# Patient Record
Sex: MALE | Race: WHITE | Employment: OTHER | URBAN - METROPOLITAN AREA
[De-identification: names, ages, dates, MRNs, and addresses within clinical notes are randomized per-mention and may not be internally consistent; named-entity substitution may affect disease eponyms.]

---

## 2017-02-13 ENCOUNTER — OFFICE VISIT (OUTPATIENT)
Dept: ORTHOPEDICS CLINIC | Facility: CLINIC | Age: 57
End: 2017-02-13

## 2017-02-13 ENCOUNTER — HOSPITAL ENCOUNTER (OUTPATIENT)
Dept: GENERAL RADIOLOGY | Facility: HOSPITAL | Age: 57
Discharge: HOME | End: 2017-02-13
Attending: ORTHOPAEDIC SURGERY
Payer: COMMERCIAL

## 2017-02-13 DIAGNOSIS — M25.562 LEFT KNEE PAIN, UNSPECIFIED CHRONICITY: ICD-10-CM

## 2017-02-13 DIAGNOSIS — M16.10 ARTHRITIS OF HIP: ICD-10-CM

## 2017-02-13 DIAGNOSIS — M25.552 LEFT HIP PAIN: ICD-10-CM

## 2017-02-13 DIAGNOSIS — M87.052 AVASCULAR NECROSIS OF HIP, LEFT (HCC): Primary | ICD-10-CM

## 2017-02-13 PROCEDURE — 73560 X-RAY EXAM OF KNEE 1 OR 2: CPT

## 2017-02-13 PROCEDURE — 99212 OFFICE O/P EST SF 10 MIN: CPT | Performed by: ORTHOPAEDIC SURGERY

## 2017-02-13 PROCEDURE — 73502 X-RAY EXAM HIP UNI 2-3 VIEWS: CPT

## 2017-02-13 PROCEDURE — 99203 OFFICE O/P NEW LOW 30 MIN: CPT | Performed by: ORTHOPAEDIC SURGERY

## 2017-02-13 PROCEDURE — 73565 X-RAY EXAM OF KNEES: CPT

## 2017-02-13 RX ORDER — VALACYCLOVIR HYDROCHLORIDE 1 G/1
TABLET, FILM COATED ORAL
Refills: 3 | COMMUNITY
Start: 2016-12-22 | End: 2017-09-28

## 2017-02-13 NOTE — PROGRESS NOTES
Patient is a 60-year-old male who presents with long-standing history of left groin pain it became more pronounced 6 months ago.   He states he fell ice skating 7 years ago and was told he had a small pelvic fracture but never required being on crutches for AVN    Plan I discussed with him surgical versus conservative care with us just meeting today he would like to see if this could settle down and if this femoral head heels and incorporates he will be left with arthritis which the only surgical solution is

## 2017-03-27 ENCOUNTER — OFFICE VISIT (OUTPATIENT)
Dept: ORTHOPEDICS CLINIC | Facility: CLINIC | Age: 57
End: 2017-03-27

## 2017-03-27 DIAGNOSIS — M87.052 AVASCULAR NECROSIS OF HIP, LEFT (HCC): Primary | ICD-10-CM

## 2017-03-27 DIAGNOSIS — M16.10 ARTHRITIS OF HIP: ICD-10-CM

## 2017-03-27 PROCEDURE — 99213 OFFICE O/P EST LOW 20 MIN: CPT | Performed by: ORTHOPAEDIC SURGERY

## 2017-03-27 PROCEDURE — 99212 OFFICE O/P EST SF 10 MIN: CPT | Performed by: ORTHOPAEDIC SURGERY

## 2017-03-27 NOTE — PROGRESS NOTES
James Hanks presents for follow-up stating that he is much improved since his last visit. He originally rated his pain a 9 or 10 he rates it now it is 3. He has use of crutches which is provided significant relief and he is only taken occasional Aleve now.   He

## 2017-05-08 ENCOUNTER — HOSPITAL ENCOUNTER (OUTPATIENT)
Dept: GENERAL RADIOLOGY | Facility: HOSPITAL | Age: 57
Discharge: HOME | End: 2017-05-08
Attending: ORTHOPAEDIC SURGERY
Payer: COMMERCIAL

## 2017-05-08 ENCOUNTER — OFFICE VISIT (OUTPATIENT)
Dept: ORTHOPEDICS CLINIC | Facility: CLINIC | Age: 57
End: 2017-05-08

## 2017-05-08 DIAGNOSIS — R52 PAIN: ICD-10-CM

## 2017-05-08 DIAGNOSIS — M87.052 AVASCULAR NECROSIS OF HIP, LEFT (HCC): Primary | ICD-10-CM

## 2017-05-08 PROCEDURE — 72170 X-RAY EXAM OF PELVIS: CPT | Performed by: ORTHOPAEDIC SURGERY

## 2017-05-08 PROCEDURE — 99212 OFFICE O/P EST SF 10 MIN: CPT | Performed by: ORTHOPAEDIC SURGERY

## 2017-05-08 PROCEDURE — 99213 OFFICE O/P EST LOW 20 MIN: CPT | Performed by: ORTHOPAEDIC SURGERY

## 2017-05-08 NOTE — PROGRESS NOTES
She presents today for follow-up stating the pain is much improved. He is walking without an antalgic gait and without crutches and states he takes an occasional Aleve which is surprising to me.   The most important thing that he tells me today as his pain

## 2017-07-24 ENCOUNTER — HOSPITAL ENCOUNTER (OUTPATIENT)
Dept: GENERAL RADIOLOGY | Facility: HOSPITAL | Age: 57
Discharge: HOME | End: 2017-07-24
Attending: ORTHOPAEDIC SURGERY
Payer: COMMERCIAL

## 2017-07-24 ENCOUNTER — OFFICE VISIT (OUTPATIENT)
Dept: ORTHOPEDICS CLINIC | Facility: CLINIC | Age: 57
End: 2017-07-24

## 2017-07-24 DIAGNOSIS — M25.552 LEFT HIP PAIN: ICD-10-CM

## 2017-07-24 DIAGNOSIS — M87.052 AVASCULAR NECROSIS OF HIP, LEFT (HCC): Primary | ICD-10-CM

## 2017-07-24 PROCEDURE — 99213 OFFICE O/P EST LOW 20 MIN: CPT | Performed by: ORTHOPAEDIC SURGERY

## 2017-07-24 PROCEDURE — 99212 OFFICE O/P EST SF 10 MIN: CPT | Performed by: ORTHOPAEDIC SURGERY

## 2017-07-24 PROCEDURE — 73502 X-RAY EXAM HIP UNI 2-3 VIEWS: CPT | Performed by: ORTHOPAEDIC SURGERY

## 2017-07-24 NOTE — PROGRESS NOTES
Patient presents for follow-up he has some left hip discomfort but with surprising as he can ambulate about the city with occasional ache. If he sits for a while after long walks she will get up and have an ache and he can walk it off.   He denies night pa

## 2017-08-01 ENCOUNTER — TELEPHONE (OUTPATIENT)
Dept: ORTHOPEDICS CLINIC | Facility: CLINIC | Age: 57
End: 2017-08-01

## 2017-08-01 NOTE — TELEPHONE ENCOUNTER
Called GEORGETTE and s/w Ramesh to initiate PA for MRI left Hip. Gave clinicals per JR OV notes. MRI pending for clinicals. Tracking #[id number]. Clinicals faxed.

## 2017-08-04 NOTE — TELEPHONE ENCOUNTER
Per [email or web address]-    The reference number for this request is [id number]. This request is only valid from 8/1/2017 to 8/31/2017 at [address] [facility] and informed him of MRI auth and exp date. Gave him phone # to  to make appt and advised him to f/u after.

## 2017-08-23 ENCOUNTER — HOSPITAL ENCOUNTER (OUTPATIENT)
Dept: MRI IMAGING | Age: 57
Discharge: HOME | End: 2017-08-23
Attending: ORTHOPAEDIC SURGERY
Payer: COMMERCIAL

## 2017-08-23 DIAGNOSIS — M87.052 AVASCULAR NECROSIS OF HIP, LEFT (HCC): ICD-10-CM

## 2017-08-23 PROCEDURE — 73721 MRI JNT OF LWR EXTRE W/O DYE: CPT | Performed by: ORTHOPAEDIC SURGERY

## 2017-08-24 ENCOUNTER — TELEPHONE (OUTPATIENT)
Dept: ORTHOPEDICS CLINIC | Facility: CLINIC | Age: 57
End: 2017-08-24

## 2017-08-24 NOTE — TELEPHONE ENCOUNTER
Pt had MRI yesterday. Can not make it in August 28th - pt in [place]  Sept 4th Labor day   Sept 11th Dr. Mirella Ochoa off. Appointment has been made for Sept 18th. Please review MRI and call pt with results if able.

## 2017-08-24 NOTE — TELEPHONE ENCOUNTER
Christie Groves will definetly make himself available for Dr. Burgess Joseph phone call.    Please try again

## 2017-09-18 ENCOUNTER — OFFICE VISIT (OUTPATIENT)
Dept: ORTHOPEDICS CLINIC | Facility: CLINIC | Age: 57
End: 2017-09-18

## 2017-09-18 DIAGNOSIS — M16.10 ARTHRITIS OF HIP: ICD-10-CM

## 2017-09-18 DIAGNOSIS — M87.052 AVASCULAR NECROSIS OF HIP, LEFT (HCC): Primary | ICD-10-CM

## 2017-09-18 PROCEDURE — 99212 OFFICE O/P EST SF 10 MIN: CPT | Performed by: ORTHOPAEDIC SURGERY

## 2017-09-18 PROCEDURE — 99213 OFFICE O/P EST LOW 20 MIN: CPT | Performed by: ORTHOPAEDIC SURGERY

## 2017-09-18 NOTE — PROGRESS NOTES
Patient presents for follow-up his MRI was discussed with him and reviewed in detail today.   He is avascular necrosis which is confirmed there is still a spherical head joint but there is not a flat collapse or architectural alteration prior x-rays were co

## 2017-09-28 ENCOUNTER — OFFICE VISIT (OUTPATIENT)
Dept: INTERNAL MEDICINE CLINIC | Facility: CLINIC | Age: 57
End: 2017-09-28

## 2017-09-28 VITALS
RESPIRATION RATE: 18 BRPM | WEIGHT: 188.38 LBS | DIASTOLIC BLOOD PRESSURE: 114 MMHG | BODY MASS INDEX: 26.67 KG/M2 | HEIGHT: 70.5 IN | HEART RATE: 110 BPM | TEMPERATURE: 99 F | SYSTOLIC BLOOD PRESSURE: 174 MMHG

## 2017-09-28 DIAGNOSIS — M87.9 OSTEONECROSIS OF LEFT HIP (HCC): ICD-10-CM

## 2017-09-28 DIAGNOSIS — Z12.11 COLON CANCER SCREENING: ICD-10-CM

## 2017-09-28 DIAGNOSIS — Z72.89 ALCOHOL USE: ICD-10-CM

## 2017-09-28 DIAGNOSIS — R03.0 ELEVATED BLOOD PRESSURE READING: Primary | ICD-10-CM

## 2017-09-28 PROCEDURE — 99203 OFFICE O/P NEW LOW 30 MIN: CPT | Performed by: INTERNAL MEDICINE

## 2017-09-28 PROCEDURE — 99212 OFFICE O/P EST SF 10 MIN: CPT | Performed by: INTERNAL MEDICINE

## 2017-09-28 RX ORDER — VALACYCLOVIR HYDROCHLORIDE 1 G/1
TABLET, FILM COATED ORAL
Qty: 21 TABLET | Refills: 3 | Status: SHIPPED | OUTPATIENT
Start: 2017-09-28 | End: 2018-06-15

## 2017-09-28 NOTE — PROGRESS NOTES
HPI:    Patient ID: Mark Anthony Brumfield is a 65year old male. HPI  Patient is here for initial visit to meet and greet as well as requesting referral to GI doctor for colon cancer screening.   He has been seen here by the orthopedic doctor for recent dena Musculoskeletal: Positive for joint pain. Skin: Negative for rash. Neurological: Negative for weakness, numbness and headaches. Hematological: Does not bruise/bleed easily. Psychiatric/Behavioral: Negative for depressed mood.  The patient is not n GI doctor.    (M87.9) Osteonecrosis of left hip ([address]  Plan: Continue follow-up with orthopedic.    (Z78.9) Alcohol use  Plan: Patient was a little bit unclear on exactly how much she was using.   I did voice concern for possible alcohol withdrawal based on

## 2017-09-29 ENCOUNTER — TELEPHONE (OUTPATIENT)
Dept: INTERNAL MEDICINE CLINIC | Facility: CLINIC | Age: 57
End: 2017-09-29

## 2017-09-29 ENCOUNTER — NURSE ONLY (OUTPATIENT)
Dept: INTERNAL MEDICINE CLINIC | Facility: CLINIC | Age: 57
End: 2017-09-29

## 2017-09-29 VITALS — HEART RATE: 92 BPM | SYSTOLIC BLOOD PRESSURE: 162 MMHG | DIASTOLIC BLOOD PRESSURE: 106 MMHG

## 2017-09-29 DIAGNOSIS — R03.0 ELEVATED BLOOD PRESSURE READING: Primary | ICD-10-CM

## 2017-09-29 RX ORDER — METOPROLOL SUCCINATE 50 MG/1
50 TABLET, EXTENDED RELEASE ORAL DAILY
Qty: 30 TABLET | Refills: 5 | Status: SHIPPED | OUTPATIENT
Start: 2017-09-29 | End: 2018-04-29

## 2017-09-29 NOTE — PROGRESS NOTES
Patient here for blood pressure reading name and birth date confirmed. Patient confirms he is on no b/p medication at this time. Patient states \" I am very nervous\". Advised patient to take a few deep breaths and I will return to obtain his b/p.   Blood p

## 2017-09-29 NOTE — TELEPHONE ENCOUNTER
B/P check readings   1.160/102 pulse 104, 2. 142/100 pulse 94 3. 162/106 pulse 92. Patient advised to await call for recommendations.

## 2017-09-29 NOTE — TELEPHONE ENCOUNTER
Pt contacted, discussed hypertension, risk factors, ways to improve it, changing lifestyle habits, decreasing use of sodium, alcohol, increasing exercise, reducing stress. Pt verbalized understanding. Medication sent to pharmacy on file.

## 2017-09-29 NOTE — TELEPHONE ENCOUNTER
Please call the patient and discussed with him. Blood pressure is still elevated. We should start low-dose Toprol-XL 50 mg, dispense #30, 1 p.o. daily, 5 refills. Patient should start immediately. Contact the office with side effects.   Otherwise make a

## 2017-10-24 NOTE — PROGRESS NOTES
[address] [facility] [address] Gastroenterology                                                                                                  Clinic History and Physical     Pa Packs/day: 0.00      Years: 0.00         Types: Cigars  Smokeless tobacco: Never Used                      Alcohol use:  Yes              Comment: occ       Medications (Active prior to today's visit):    Current Outpatient P and oriented x4, and patient is having movements of all 4 extremities   Psych: Pt has a normal mood and affect, behavior is normal    Nursing note and vitals reviewed      Labs/Imaging:     Patient's labs and imaging were reviewed and discussed with patirosalie the miss rate of colonoscopy of 5-10% in the best of all circumstances. All questions were answered to the patient’s satisfaction.  The patient signed informed consent and elected to proceed with colonoscopy with intervention [i.e. polypectomy, stent placem

## 2017-10-25 ENCOUNTER — OFFICE VISIT (OUTPATIENT)
Dept: GASTROENTEROLOGY | Facility: CLINIC | Age: 57
End: 2017-10-25

## 2017-10-25 ENCOUNTER — TELEPHONE (OUTPATIENT)
Dept: GASTROENTEROLOGY | Facility: CLINIC | Age: 57
End: 2017-10-25

## 2017-10-25 VITALS
HEIGHT: 71.5 IN | HEART RATE: 72 BPM | BODY MASS INDEX: 26.02 KG/M2 | DIASTOLIC BLOOD PRESSURE: 98 MMHG | SYSTOLIC BLOOD PRESSURE: 144 MMHG | WEIGHT: 190 LBS

## 2017-10-25 DIAGNOSIS — Z12.11 SCREENING FOR COLON CANCER: Primary | ICD-10-CM

## 2017-10-25 PROCEDURE — 99212 OFFICE O/P EST SF 10 MIN: CPT | Performed by: NURSE PRACTITIONER

## 2017-10-25 PROCEDURE — 99243 OFF/OP CNSLTJ NEW/EST LOW 30: CPT | Performed by: NURSE PRACTITIONER

## 2017-10-25 NOTE — PATIENT INSTRUCTIONS
1. Schedule colonoscopy with Dr. Mathew Moore w/ IV Twilight    2.  bowel prep from pharmacy - split dose Suprep     3. Continue all medications for procedure     4. Read all bowel prep instructions carefully    5.  AVOID seeds, nuts, popcorn, raw fruit

## 2017-10-25 NOTE — H&P
[address] [facility] [address] Gastroenterology                                                                                                  Clinic History and Physical     Pa Packs/day: 0.00      Years: 0.00         Types: Cigars  Smokeless tobacco: Never Used                      Alcohol use:  Yes              Comment: occ       Medications (Active prior to today's visit):    Current Outpatient P and oriented x4, and patient is having movements of all 4 extremities   Psych: Pt has a normal mood and affect, behavior is normal    Nursing note and vitals reviewed      Labs/Imaging:     Patient's labs and imaging were reviewed and discussed with patirosalie colonoscopy of 5-10% in the best of all circumstances. All questions were answered to the patient’s satisfaction.  The patient signed informed consent and elected to proceed with colonoscopy with intervention [i.e. polypectomy, stent placement, etc.] as ind

## 2017-10-25 NOTE — TELEPHONE ENCOUNTER
Scheduled for:  Colonoscopy 94911  Provider Name: Dr. Bernadette Rodgers  Date:  Monday, 1/8/2018  Location:  [facility]  Sedation:  IV sedation  Time:  9:00 am, arrival 8:00 am  Prep: Suprep  Meds/Allergies Reconciled?:  EM Allen reviewed   Diagnosis with codes:  Scr

## 2017-11-28 ENCOUNTER — OFFICE VISIT (OUTPATIENT)
Dept: INTERNAL MEDICINE CLINIC | Facility: CLINIC | Age: 57
End: 2017-11-28

## 2017-11-28 VITALS
TEMPERATURE: 99 F | SYSTOLIC BLOOD PRESSURE: 160 MMHG | HEIGHT: 71.5 IN | BODY MASS INDEX: 25.75 KG/M2 | WEIGHT: 188 LBS | HEART RATE: 111 BPM | DIASTOLIC BLOOD PRESSURE: 104 MMHG

## 2017-11-28 DIAGNOSIS — I10 ESSENTIAL HYPERTENSION: ICD-10-CM

## 2017-11-28 DIAGNOSIS — K13.70 ORAL LESION: Primary | ICD-10-CM

## 2017-11-28 PROCEDURE — 99213 OFFICE O/P EST LOW 20 MIN: CPT | Performed by: INTERNAL MEDICINE

## 2017-11-28 PROCEDURE — 99212 OFFICE O/P EST SF 10 MIN: CPT | Performed by: INTERNAL MEDICINE

## 2017-11-28 NOTE — PROGRESS NOTES
Rosario Gonzalez is a 65year old male.  Patient presents with:  Throat Problem: Pt c/o a lump on the right side of the throat that was noticed this past weekend    HPI:   On Sunday, 2 days ago, he developed low-grade fever of 100.9°, sweats and chills, a maxillary or frontal sinus tenderness, oropharynx with a roundish area of soft tissue prominence anterior to the right tonsil with slight overlying erythema without ulceration or exudate  NECK: Supple without mass or lymphadenopathy  LUNGS: Resonant to per

## 2017-11-28 NOTE — PATIENT INSTRUCTIONS
Please schedule an appointment with ENT. Please resume taking metoprolol daily. Follow-up soon with Dr. Arabella Gabriel.

## 2017-11-29 ENCOUNTER — MED REC SCAN ONLY (OUTPATIENT)
Dept: INTERNAL MEDICINE CLINIC | Facility: CLINIC | Age: 57
End: 2017-11-29

## 2017-11-29 ENCOUNTER — OFFICE VISIT (OUTPATIENT)
Dept: OTOLARYNGOLOGY | Facility: CLINIC | Age: 57
End: 2017-11-29

## 2017-11-29 ENCOUNTER — TELEPHONE (OUTPATIENT)
Dept: OTOLARYNGOLOGY | Facility: CLINIC | Age: 57
End: 2017-11-29

## 2017-11-29 ENCOUNTER — IMMUNIZATION (OUTPATIENT)
Dept: INTERNAL MEDICINE CLINIC | Facility: CLINIC | Age: 57
End: 2017-11-29

## 2017-11-29 VITALS
DIASTOLIC BLOOD PRESSURE: 98 MMHG | WEIGHT: 188 LBS | TEMPERATURE: 98 F | BODY MASS INDEX: 25.75 KG/M2 | HEIGHT: 71.5 IN | SYSTOLIC BLOOD PRESSURE: 170 MMHG

## 2017-11-29 DIAGNOSIS — J35.8 TONSILLAR MASS: Primary | ICD-10-CM

## 2017-11-29 DIAGNOSIS — Z23 NEED FOR VACCINATION: ICD-10-CM

## 2017-11-29 PROCEDURE — 99212 OFFICE O/P EST SF 10 MIN: CPT | Performed by: OTOLARYNGOLOGY

## 2017-11-29 PROCEDURE — 99244 OFF/OP CNSLTJ NEW/EST MOD 40: CPT | Performed by: OTOLARYNGOLOGY

## 2017-11-29 PROCEDURE — 90471 IMMUNIZATION ADMIN: CPT | Performed by: INTERNAL MEDICINE

## 2017-11-29 PROCEDURE — 90686 IIV4 VACC NO PRSV 0.5 ML IM: CPT | Performed by: INTERNAL MEDICINE

## 2017-11-29 RX ORDER — METHYLPREDNISOLONE 4 MG/1
TABLET ORAL
Qty: 1 PACKAGE | Refills: 0 | Status: SHIPPED | OUTPATIENT
Start: 2017-11-29 | End: 2017-12-27 | Stop reason: ALTCHOICE

## 2017-11-29 RX ORDER — AMOXICILLIN AND CLAVULANATE POTASSIUM 875; 125 MG/1; MG/1
1 TABLET, FILM COATED ORAL 2 TIMES DAILY
Qty: 28 TABLET | Refills: 0 | Status: SHIPPED | OUTPATIENT
Start: 2017-11-29 | End: 2017-12-13

## 2017-11-29 NOTE — TELEPHONE ENCOUNTER
Called pt's insurance [phone], spoke with Yanira Vergara, case for CT soft tissue of neck is currently pending clinical review, progress notes faxed to [phone], confirmation received, tracking number [id number].

## 2017-11-29 NOTE — PROGRESS NOTES
Davey Lozano is a 65year old male. Patient presents with:  Lesion: lower right side of mouth near right tonsil       HISTORY OF PRESENT ILLNESS  11/29/2017  Patient prevents for evaluation of a suspected right throat mass.   After his girlfriends da Abdominal pain and diarrhea. Endocrine Negative Cold intolerance and heat intolerance. Neuro Negative Tremors. Psych Negative Anxiety and depression. Integumentary Negative Frequent skin infections, pigment change and rash.    Hema/Lymph Negative Ea Outpatient Prescriptions:   •  methylPREDNISolone 4 MG Oral Tablet Therapy Pack, Take by oral route., Disp: 1 Package, Rfl: 0  •  Amoxicillin-Pot Clavulanate (AUGMENTIN) 875-125 MG Oral Tab, Take 1 tablet by mouth 2 (two) times daily. , Disp: 28 tablet, Rfl

## 2017-12-05 ENCOUNTER — HOSPITAL ENCOUNTER (OUTPATIENT)
Dept: CT IMAGING | Facility: HOSPITAL | Age: 57
Discharge: HOME | End: 2017-12-05
Attending: OTOLARYNGOLOGY
Payer: COMMERCIAL

## 2017-12-05 ENCOUNTER — TELEPHONE (OUTPATIENT)
Dept: GASTROENTEROLOGY | Facility: CLINIC | Age: 57
End: 2017-12-05

## 2017-12-05 DIAGNOSIS — J35.8 TONSILLAR MASS: ICD-10-CM

## 2017-12-05 PROCEDURE — 70491 CT SOFT TISSUE NECK W/DYE: CPT | Performed by: OTOLARYNGOLOGY

## 2017-12-05 PROCEDURE — 82565 ASSAY OF CREATININE: CPT

## 2017-12-06 NOTE — TELEPHONE ENCOUNTER
I spoke with this patient today to reschedule his procedure to an earlier date (before the end of the year) which I check and there was nothing available.  He stated that he wanted to reschedule earlier since [facility] will cover the procedure at 100% but ne

## 2017-12-27 ENCOUNTER — OFFICE VISIT (OUTPATIENT)
Dept: OTOLARYNGOLOGY | Facility: CLINIC | Age: 57
End: 2017-12-27

## 2017-12-27 VITALS
TEMPERATURE: 98 F | SYSTOLIC BLOOD PRESSURE: 139 MMHG | HEIGHT: 71 IN | DIASTOLIC BLOOD PRESSURE: 92 MMHG | BODY MASS INDEX: 26.6 KG/M2 | WEIGHT: 190 LBS

## 2017-12-27 DIAGNOSIS — J35.8 TONSILLAR MASS: Primary | ICD-10-CM

## 2017-12-27 PROCEDURE — 42100 BIOPSY ROOF OF MOUTH: CPT | Performed by: OTOLARYNGOLOGY

## 2017-12-27 PROCEDURE — 99212 OFFICE O/P EST SF 10 MIN: CPT | Performed by: OTOLARYNGOLOGY

## 2017-12-27 PROCEDURE — 99213 OFFICE O/P EST LOW 20 MIN: CPT | Performed by: OTOLARYNGOLOGY

## 2017-12-27 NOTE — PROGRESS NOTES
Arabella Reyes is a 65year old male. Patient presents with:  Results: CT soft tissue of neck done on 12-5      HISTORY OF PRESENT ILLNESS  11/29/2017  Patient prevents for evaluation of a suspected right throat mass.   After his girlfriends daughter w Past Medical History:   Diagnosis Date   • Decorative tattoo    • Essential hypertension        History reviewed. No pertinent surgical history.       REVIEW OF SYSTEMS    System Neg/Pos Details   Constitutional Negative Fatigue, fever and weight loss appears to be posterior to the anterior tonsillar pillar with increased vascularity. I wonder if this is a submucosal mass ie minor salivary gland tumor or perhaps a tonsillar mass extending through the mucosa?   On palpation it is firm and it is nontender

## 2017-12-28 ENCOUNTER — TELEPHONE (OUTPATIENT)
Dept: GASTROENTEROLOGY | Facility: CLINIC | Age: 57
End: 2017-12-28

## 2017-12-28 DIAGNOSIS — Z12.11 COLON CANCER SCREENING: Primary | ICD-10-CM

## 2017-12-28 NOTE — TELEPHONE ENCOUNTER
Rescheduled for:  Colonoscopy - [address] [facility]    Provider Name:  Dr. Juliet Glover  Date:   FROM - 1/8/18                        TO - 2/7/18  Location:   FROM - [facility]                      TO [facility]  Sedation:  IV  Time:   FROM - 9:00 am                      TO - (pt is a

## 2017-12-29 ENCOUNTER — TELEPHONE (OUTPATIENT)
Dept: OTOLARYNGOLOGY | Facility: CLINIC | Age: 57
End: 2017-12-29

## 2017-12-29 DIAGNOSIS — C09.9 RIGHT TONSILLAR SQUAMOUS CELL CARCINOMA (HCC): Primary | ICD-10-CM

## 2018-01-01 ENCOUNTER — APPOINTMENT (OUTPATIENT)
Dept: RADIATION ONCOLOGY | Facility: HOSPITAL | Age: 58
End: 2018-01-01
Attending: OTOLARYNGOLOGY
Payer: COMMERCIAL

## 2018-01-02 ENCOUNTER — TELEPHONE (OUTPATIENT)
Dept: OTOLARYNGOLOGY | Facility: CLINIC | Age: 58
End: 2018-01-02

## 2018-01-02 DIAGNOSIS — Z01.818 PRE-OP TESTING: Primary | ICD-10-CM

## 2018-01-02 NOTE — TELEPHONE ENCOUNTER
Called pt's insurance [phone], spoke with Lulu Landaverde, a prior authorization is not needed for PET scan, reference number [id number]. Left message for pt to call back to inform of above authorization information.

## 2018-01-02 NOTE — TELEPHONE ENCOUNTER
Left message for pt to call back with phone number for [facility] in order to obtain a prior authorization for the PET scan.

## 2018-01-02 NOTE — TELEPHONE ENCOUNTER
Pt called stating pt is returning a call. Information regarding prior auth not needed for pet scan was given to the pt. Pt requesting a call back from the nurse.   Call

## 2018-01-02 NOTE — TELEPHONE ENCOUNTER
pt called. LOV 12/27/17 with Madhuri Box. Spoke with Madhuri Box on 12/28/17 with path results. He is waiting for a call to schedule a procedure. Please call.

## 2018-01-04 ENCOUNTER — APPOINTMENT (OUTPATIENT)
Dept: LAB | Facility: HOSPITAL | Age: 58
End: 2018-01-04
Attending: OTOLARYNGOLOGY
Payer: COMMERCIAL

## 2018-01-04 ENCOUNTER — HOSPITAL ENCOUNTER (OUTPATIENT)
Dept: NUCLEAR MEDICINE | Facility: HOSPITAL | Age: 58
Discharge: HOME | End: 2018-01-04
Attending: OTOLARYNGOLOGY
Payer: COMMERCIAL

## 2018-01-04 ENCOUNTER — HOSPITAL ENCOUNTER (OUTPATIENT)
Dept: GENERAL RADIOLOGY | Facility: HOSPITAL | Age: 58
Discharge: HOME | End: 2018-01-04
Attending: OTOLARYNGOLOGY
Payer: COMMERCIAL

## 2018-01-04 DIAGNOSIS — Z01.818 PRE-OP TESTING: ICD-10-CM

## 2018-01-04 DIAGNOSIS — C09.9 RIGHT TONSILLAR SQUAMOUS CELL CARCINOMA (HCC): ICD-10-CM

## 2018-01-04 LAB
ANION GAP SERPL CALC-SCNC: 12 MMOL/L (ref 0–18)
APTT PPP: 27.7 SECONDS (ref 23.2–35.3)
BUN SERPL-MCNC: 13 MG/DL (ref 8–20)
BUN/CREAT SERPL: 16.9 (ref 10–20)
CALCIUM SERPL-MCNC: 9 MG/DL (ref 8.5–10.5)
CHLORIDE SERPL-SCNC: 100 MMOL/L (ref 95–110)
CO2 SERPL-SCNC: 26 MMOL/L (ref 22–32)
CREAT SERPL-MCNC: 0.77 MG/DL (ref 0.5–1.5)
ERYTHROCYTE [DISTWIDTH] IN BLOOD BY AUTOMATED COUNT: 12.7 % (ref 11–15)
GLUCOSE BLDC GLUCOMTR-MCNC: 127 MG/DL (ref 70–99)
GLUCOSE SERPL-MCNC: 120 MG/DL (ref 70–99)
HCT VFR BLD AUTO: 46.9 % (ref 41–52)
HGB BLD-MCNC: 16 G/DL (ref 13.5–17.5)
INR BLD: 1.1 (ref 0.9–1.2)
MCH RBC QN AUTO: 33.8 PG (ref 27–32)
MCHC RBC AUTO-ENTMCNC: 34.1 G/DL (ref 32–37)
MCV RBC AUTO: 99.2 FL (ref 80–100)
OSMOLALITY UR CALC.SUM OF ELEC: 287 MOSM/KG (ref 275–295)
PLATELET # BLD AUTO: 190 K/UL (ref 140–400)
PMV BLD AUTO: 7.8 FL (ref 7.4–10.3)
POTASSIUM SERPL-SCNC: 4.4 MMOL/L (ref 3.3–5.1)
PROTHROMBIN TIME: 13.8 SECONDS (ref 11.8–14.5)
RBC # BLD AUTO: 4.72 M/UL (ref 4.5–5.9)
SODIUM SERPL-SCNC: 138 MMOL/L (ref 136–144)
WBC # BLD AUTO: 7.2 K/UL (ref 4–11)

## 2018-01-04 PROCEDURE — 82962 GLUCOSE BLOOD TEST: CPT

## 2018-01-04 PROCEDURE — 36415 COLL VENOUS BLD VENIPUNCTURE: CPT

## 2018-01-04 PROCEDURE — 85730 THROMBOPLASTIN TIME PARTIAL: CPT

## 2018-01-04 PROCEDURE — 71046 X-RAY EXAM CHEST 2 VIEWS: CPT | Performed by: OTOLARYNGOLOGY

## 2018-01-04 PROCEDURE — 80048 BASIC METABOLIC PNL TOTAL CA: CPT

## 2018-01-04 PROCEDURE — 93005 ELECTROCARDIOGRAM TRACING: CPT

## 2018-01-04 PROCEDURE — 85027 COMPLETE CBC AUTOMATED: CPT

## 2018-01-04 PROCEDURE — 85610 PROTHROMBIN TIME: CPT

## 2018-01-04 PROCEDURE — 78815 PET IMAGE W/CT SKULL-THIGH: CPT | Performed by: OTOLARYNGOLOGY

## 2018-01-04 PROCEDURE — 93010 ELECTROCARDIOGRAM REPORT: CPT | Performed by: OTOLARYNGOLOGY

## 2018-01-05 ENCOUNTER — ANESTHESIA EVENT (OUTPATIENT)
Dept: SURGERY | Facility: HOSPITAL | Age: 58
End: 2018-01-05
Payer: COMMERCIAL

## 2018-01-05 ENCOUNTER — HOSPITAL ENCOUNTER (OUTPATIENT)
Facility: HOSPITAL | Age: 58
Setting detail: HOSPITAL OUTPATIENT SURGERY
Discharge: HOME | End: 2018-01-05
Attending: OTOLARYNGOLOGY | Admitting: OTOLARYNGOLOGY
Payer: COMMERCIAL

## 2018-01-05 ENCOUNTER — ANESTHESIA (OUTPATIENT)
Dept: SURGERY | Facility: HOSPITAL | Age: 58
End: 2018-01-05
Payer: COMMERCIAL

## 2018-01-05 ENCOUNTER — SURGERY (OUTPATIENT)
Age: 58
End: 2018-01-05

## 2018-01-05 VITALS
WEIGHT: 190 LBS | BODY MASS INDEX: 26.02 KG/M2 | RESPIRATION RATE: 16 BRPM | OXYGEN SATURATION: 96 % | SYSTOLIC BLOOD PRESSURE: 160 MMHG | HEART RATE: 73 BPM | HEIGHT: 71.5 IN | DIASTOLIC BLOOD PRESSURE: 99 MMHG | TEMPERATURE: 98 F

## 2018-01-05 DIAGNOSIS — C09.9 CANCER OF TONSIL (HCC): ICD-10-CM

## 2018-01-05 PROCEDURE — 0CBPXZZ EXCISION OF TONSILS, EXTERNAL APPROACH: ICD-10-PCS | Performed by: OTOLARYNGOLOGY

## 2018-01-05 PROCEDURE — 42842 EXTENSIVE SURGERY OF THROAT: CPT | Performed by: OTOLARYNGOLOGY

## 2018-01-05 RX ORDER — ONDANSETRON 2 MG/ML
INJECTION INTRAMUSCULAR; INTRAVENOUS AS NEEDED
Status: DISCONTINUED | OUTPATIENT
Start: 2018-01-05 | End: 2018-01-05 | Stop reason: SURG

## 2018-01-05 RX ORDER — MORPHINE SULFATE 2 MG/ML
2 INJECTION, SOLUTION INTRAMUSCULAR; INTRAVENOUS EVERY 10 MIN PRN
Status: DISCONTINUED | OUTPATIENT
Start: 2018-01-05 | End: 2018-01-05

## 2018-01-05 RX ORDER — CELECOXIB 200 MG/1
CAPSULE ORAL
Qty: 14 CAPSULE | Refills: 0 | Status: SHIPPED | OUTPATIENT
Start: 2018-01-05 | End: 2018-01-18

## 2018-01-05 RX ORDER — GLYCOPYRROLATE 0.2 MG/ML
INJECTION INTRAMUSCULAR; INTRAVENOUS AS NEEDED
Status: DISCONTINUED | OUTPATIENT
Start: 2018-01-05 | End: 2018-01-05 | Stop reason: SURG

## 2018-01-05 RX ORDER — SODIUM CHLORIDE, SODIUM LACTATE, POTASSIUM CHLORIDE, CALCIUM CHLORIDE 600; 310; 30; 20 MG/100ML; MG/100ML; MG/100ML; MG/100ML
INJECTION, SOLUTION INTRAVENOUS CONTINUOUS
Status: DISCONTINUED | OUTPATIENT
Start: 2018-01-05 | End: 2018-01-05

## 2018-01-05 RX ORDER — NALOXONE HYDROCHLORIDE 0.4 MG/ML
80 INJECTION, SOLUTION INTRAMUSCULAR; INTRAVENOUS; SUBCUTANEOUS AS NEEDED
Status: DISCONTINUED | OUTPATIENT
Start: 2018-01-05 | End: 2018-01-05

## 2018-01-05 RX ORDER — ONDANSETRON 2 MG/ML
4 INJECTION INTRAMUSCULAR; INTRAVENOUS ONCE AS NEEDED
Status: DISCONTINUED | OUTPATIENT
Start: 2018-01-05 | End: 2018-01-05

## 2018-01-05 RX ORDER — ACETAMINOPHEN 500 MG
1000 TABLET ORAL ONCE
Status: COMPLETED | OUTPATIENT
Start: 2018-01-05 | End: 2018-01-05

## 2018-01-05 RX ORDER — DEXAMETHASONE SODIUM PHOSPHATE 4 MG/ML
VIAL (ML) INJECTION AS NEEDED
Status: DISCONTINUED | OUTPATIENT
Start: 2018-01-05 | End: 2018-01-05 | Stop reason: SURG

## 2018-01-05 RX ORDER — HYDROCODONE BITARTRATE AND ACETAMINOPHEN 5; 325 MG/1; MG/1
2 TABLET ORAL AS NEEDED
Status: DISCONTINUED | OUTPATIENT
Start: 2018-01-05 | End: 2018-01-05

## 2018-01-05 RX ORDER — MORPHINE SULFATE 10 MG/ML
6 INJECTION, SOLUTION INTRAMUSCULAR; INTRAVENOUS EVERY 10 MIN PRN
Status: DISCONTINUED | OUTPATIENT
Start: 2018-01-05 | End: 2018-01-05

## 2018-01-05 RX ORDER — MORPHINE SULFATE 4 MG/ML
4 INJECTION, SOLUTION INTRAMUSCULAR; INTRAVENOUS EVERY 10 MIN PRN
Status: DISCONTINUED | OUTPATIENT
Start: 2018-01-05 | End: 2018-01-05

## 2018-01-05 RX ORDER — ROCURONIUM BROMIDE 10 MG/ML
INJECTION, SOLUTION INTRAVENOUS AS NEEDED
Status: DISCONTINUED | OUTPATIENT
Start: 2018-01-05 | End: 2018-01-05 | Stop reason: SURG

## 2018-01-05 RX ORDER — METOCLOPRAMIDE 10 MG/1
10 TABLET ORAL ONCE
Status: DISCONTINUED | OUTPATIENT
Start: 2018-01-05 | End: 2018-01-05 | Stop reason: HOSPADM

## 2018-01-05 RX ORDER — FAMOTIDINE 20 MG/1
20 TABLET ORAL ONCE
Status: DISCONTINUED | OUTPATIENT
Start: 2018-01-05 | End: 2018-01-05 | Stop reason: HOSPADM

## 2018-01-05 RX ORDER — HYDROMORPHONE HYDROCHLORIDE 1 MG/ML
0.2 INJECTION, SOLUTION INTRAMUSCULAR; INTRAVENOUS; SUBCUTANEOUS EVERY 5 MIN PRN
Status: DISCONTINUED | OUTPATIENT
Start: 2018-01-05 | End: 2018-01-05

## 2018-01-05 RX ORDER — ONDANSETRON 4 MG/1
4 TABLET, ORALLY DISINTEGRATING ORAL EVERY 6 HOURS PRN
Status: CANCELLED | OUTPATIENT
Start: 2018-01-05

## 2018-01-05 RX ORDER — DEXAMETHASONE 4 MG/1
TABLET ORAL
Qty: 3 TABLET | Refills: 0 | Status: SHIPPED | OUTPATIENT
Start: 2018-01-05 | End: 2018-06-15

## 2018-01-05 RX ORDER — ONDANSETRON HYDROCHLORIDE 8 MG/1
4 TABLET, FILM COATED ORAL EVERY 6 HOURS PRN
Status: CANCELLED | OUTPATIENT
Start: 2018-01-05

## 2018-01-05 RX ORDER — ONDANSETRON 2 MG/ML
4 INJECTION INTRAMUSCULAR; INTRAVENOUS EVERY 6 HOURS PRN
Status: CANCELLED | OUTPATIENT
Start: 2018-01-05

## 2018-01-05 RX ORDER — HYDROMORPHONE HYDROCHLORIDE 1 MG/ML
0.6 INJECTION, SOLUTION INTRAMUSCULAR; INTRAVENOUS; SUBCUTANEOUS EVERY 5 MIN PRN
Status: DISCONTINUED | OUTPATIENT
Start: 2018-01-05 | End: 2018-01-05

## 2018-01-05 RX ORDER — HYDROMORPHONE HYDROCHLORIDE 1 MG/ML
0.4 INJECTION, SOLUTION INTRAMUSCULAR; INTRAVENOUS; SUBCUTANEOUS EVERY 5 MIN PRN
Status: DISCONTINUED | OUTPATIENT
Start: 2018-01-05 | End: 2018-01-05

## 2018-01-05 RX ORDER — SODIUM CHLORIDE 0.9 % (FLUSH) 0.9 %
10 SYRINGE (ML) INJECTION AS NEEDED
Status: CANCELLED | OUTPATIENT
Start: 2018-01-05

## 2018-01-05 RX ORDER — LIDOCAINE HYDROCHLORIDE 10 MG/ML
INJECTION, SOLUTION EPIDURAL; INFILTRATION; INTRACAUDAL; PERINEURAL AS NEEDED
Status: DISCONTINUED | OUTPATIENT
Start: 2018-01-05 | End: 2018-01-05 | Stop reason: SURG

## 2018-01-05 RX ORDER — HYDROCODONE BITARTRATE AND ACETAMINOPHEN 5; 325 MG/1; MG/1
1 TABLET ORAL AS NEEDED
Status: DISCONTINUED | OUTPATIENT
Start: 2018-01-05 | End: 2018-01-05

## 2018-01-05 RX ORDER — METOPROLOL TARTRATE 5 MG/5ML
2.5 INJECTION INTRAVENOUS ONCE
Status: DISCONTINUED | OUTPATIENT
Start: 2018-01-05 | End: 2018-01-05

## 2018-01-05 RX ADMIN — GLYCOPYRROLATE 0.2 MG: 0.2 INJECTION INTRAMUSCULAR; INTRAVENOUS at 10:02:00

## 2018-01-05 RX ADMIN — SODIUM CHLORIDE, SODIUM LACTATE, POTASSIUM CHLORIDE, CALCIUM CHLORIDE: 600; 310; 30; 20 INJECTION, SOLUTION INTRAVENOUS at 10:44:00

## 2018-01-05 RX ADMIN — ROCURONIUM BROMIDE 10 MG: 10 INJECTION, SOLUTION INTRAVENOUS at 09:59:00

## 2018-01-05 RX ADMIN — SODIUM CHLORIDE, SODIUM LACTATE, POTASSIUM CHLORIDE, CALCIUM CHLORIDE: 600; 310; 30; 20 INJECTION, SOLUTION INTRAVENOUS at 09:57:00

## 2018-01-05 RX ADMIN — ONDANSETRON 4 MG: 2 INJECTION INTRAMUSCULAR; INTRAVENOUS at 10:02:00

## 2018-01-05 RX ADMIN — LIDOCAINE HYDROCHLORIDE 50 MG: 10 INJECTION, SOLUTION EPIDURAL; INFILTRATION; INTRACAUDAL; PERINEURAL at 09:59:00

## 2018-01-05 RX ADMIN — DEXAMETHASONE SODIUM PHOSPHATE 8 MG: 4 MG/ML VIAL (ML) INJECTION at 10:02:00

## 2018-01-05 NOTE — H&P
Rosendo Miller is a 65year old male. Patient presents with:  Results: CT soft tissue of neck done on 12-5        HISTORY OF PRESENT ILLNESS  11/29/2017  Patient prevents for evaluation of a suspected right throat mass.   After his girlfriends daughter     uterine              Past Medical History:   Diagnosis Date   • Decorative tattoo     • Essential hypertension           History reviewed.  No pertinent surgical history.        REVIEW OF SYSTEMS     System Neg/Pos Details   Constitutional Negative Fa Lips/teeth/gums - Normal.  There is a large mass that appears to be posterior to the anterior tonsillar pillar with increased vascularity.   I wonder if this is a submucosal mass ie minor salivary gland tumor or perhaps a tonsillar mass extending through th

## 2018-01-05 NOTE — ANESTHESIA PREPROCEDURE EVALUATION
Anesthesia PreOp Note    HPI:     Peewee Alonso is a 65year old male who presents for preoperative consultation requested by: Gabriela Persaud MD    Date of Surgery: 1/5/2018    Procedure(s):  TONSILLECTOMY  Indication: Cancer of tonsil ([facility] [C09.9] Results  Component Value Date   WBC 7.2 01/04/2018   RBC 4.72 01/04/2018   HGB 16.0 01/04/2018   HCT 46.9 01/04/2018   MCV 99.2 01/04/2018   MCH 33.8 (H) 01/04/2018   MCHC 34.1 01/04/2018   RDW 12.7 01/04/2018    01/04/2018   MPV 7.8 01/04/2018 planned.   Willa MCCRAY  1/5/2018 9:01 AM

## 2018-01-05 NOTE — ANESTHESIA POSTPROCEDURE EVALUATION
Patient: Cliff Peters    Procedure Summary     Date:  01/05/18 Room / Location:  [address] [place] [address] MAIN OR 03 / [address] [place] [address] MAIN OR    Anesthesia Start:  8454 Anesthesia Stop:      Procedure:  TONSILLECTOMY (Right ) Diagnosis:       Cancer of tonsil ([facility]      (Cancer of

## 2018-01-05 NOTE — OPERATIVE REPORT
Lady Latham  DATE OF SURGERY: 1/5/2018    PREOPERATIVE DIAGNOSIS:   scca of the right tonsil  POSTOPERATIVE DIAGNOSIS: Same. OPERATIVE PROCEDURE:    Radical right  Tonsillectomy-complicated  ATTENDING SURGEON:   Jerri Carias M.D.     ANESTHESIA: cc.

## 2018-01-06 ENCOUNTER — TELEPHONE (OUTPATIENT)
Dept: OTOLARYNGOLOGY | Facility: CLINIC | Age: 58
End: 2018-01-06

## 2018-01-06 NOTE — TELEPHONE ENCOUNTER
Pt is post op day 1, tonsillectomy. Pt states he is doing well, able to eat and drink.     Advised pt to continue pushing fluids, about a quart and a half a day, or six cups, avoid citrus or carbonated drinks; consume soft foods, avoid foods with texture;

## 2018-01-11 ENCOUNTER — OFFICE VISIT (OUTPATIENT)
Dept: RADIATION ONCOLOGY | Facility: HOSPITAL | Age: 58
End: 2018-01-11
Attending: RADIOLOGY
Payer: COMMERCIAL

## 2018-01-11 ENCOUNTER — OFFICE VISIT (OUTPATIENT)
Dept: HEMATOLOGY/ONCOLOGY | Facility: HOSPITAL | Age: 58
End: 2018-01-11
Attending: INTERNAL MEDICINE
Payer: COMMERCIAL

## 2018-01-11 VITALS
WEIGHT: 190.63 LBS | SYSTOLIC BLOOD PRESSURE: 162 MMHG | TEMPERATURE: 99 F | RESPIRATION RATE: 16 BRPM | HEART RATE: 57 BPM | DIASTOLIC BLOOD PRESSURE: 98 MMHG | HEIGHT: 71 IN | BODY MASS INDEX: 26.69 KG/M2

## 2018-01-11 VITALS
RESPIRATION RATE: 16 BRPM | SYSTOLIC BLOOD PRESSURE: 168 MMHG | WEIGHT: 190 LBS | BODY MASS INDEX: 26.6 KG/M2 | TEMPERATURE: 99 F | HEIGHT: 71 IN | HEART RATE: 59 BPM | DIASTOLIC BLOOD PRESSURE: 94 MMHG

## 2018-01-11 DIAGNOSIS — C09.9 RIGHT TONSILLAR SQUAMOUS CELL CARCINOMA (HCC): Primary | ICD-10-CM

## 2018-01-11 DIAGNOSIS — C09.9 TONSIL CANCER (HCC): Primary | ICD-10-CM

## 2018-01-11 PROCEDURE — 99212 OFFICE O/P EST SF 10 MIN: CPT | Performed by: INTERNAL MEDICINE

## 2018-01-11 PROCEDURE — 99212 OFFICE O/P EST SF 10 MIN: CPT

## 2018-01-11 PROCEDURE — 99245 OFF/OP CONSLTJ NEW/EST HI 55: CPT | Performed by: INTERNAL MEDICINE

## 2018-01-11 NOTE — PROGRESS NOTES
Nursing Consultation Note  Patient: Daniela Jones  YOB: 1960  Age: 65year old  Radiation Oncologist: Dr. Jarocho Hou  Referring Physician: Ellis Castillo  Diagnosis:No diagnosis found.   Consult Date: 1/11/2018      Chemotherapy: N celecoxib 200 MG Oral Cap 1 po daily for 14 days substitute advil if to expensive Disp: 14 capsule Rfl: 0       Preferred Pharmacy:    [place] [facility] [facility], [address] [place] [address], [phone], [phone] provided pt with a H&N side effect and self care booklet. Discussed the importance of nutrition during radiation and that he will meet with the dietician weekly.  Instructed to see the dentist prior to RT and to obtain a letter of clearance prior to startin

## 2018-01-11 NOTE — PROGRESS NOTES
Primary language:  English  Language line required?  no  Comprehension Ability:  excellent  Able to read?  yes  Able to write? yes  Communication tools:  na  Patient's ability to learn:  excellent  Readiness to learn:   Motivated  Learning preferences:  Gabriella Bravo progress    Pamphlets/Handouts Given to Patient:  Nutritional information     Fatigue Plan Of Care:    Problem:  Fatigue    Problems related to:    Side effect of therapy    Interventions:  Prioritize daily activities  Discuss methods to balance rest and a

## 2018-01-12 NOTE — CONSULTS
[facility] [place]    PATIENT'S NAME: HuiényENRIQUETA   CONSULTING PHYSICIAN: Pedro Montiel MD   PATIENT ACCOUNT #: [de-identified] LOCATION: [address] [place] [address] RECORD #: [id number] YOB: 1960   CONSULTATION DATE: 01/11/2018       CANCER and are negative x12. PHYSICAL EXAMINATION:    GENERAL:  No acute distress, alert and oriented. VITAL SIGNS:  ECOG performance status is 0. Weight is 86 kg. Blood pressure 160/94, pulse 69, respiratory rate 16, temperature 37.   HEENT:  Moist mucous 18:45:05  Job [id number]  [facility]/    cc: MD Earle Turcios MD   Kendy Staff  Ligia Burrows MD

## 2018-01-12 NOTE — CONSULTS
[facility]    PATIENT'S NAME: Tere Reagan, [address]    RADIATION ONCOLOGIST: Amado Fox.  Konstantin Crawford MD   PATIENT ACCOUNT #: [de-identified] LOCATION: [address] [place] [address] RECORD #: [id number] YOB: 1960   CONSULTATION DATE: 01/11/2018 margin of the tongue, but no suspicious lymphadenopathy in the neck or elsewhere. He then went to the operating room on 01/05/2018, where he underwent a radical right tonsillectomy and resection of tumor.   The final pathology came back squamous cell carci temperature 98.6. His weight is 190 pounds. HEENT:  Pupils are equal, round, reactive to light and accommodation, and the extraocular movements are intact.   On oral cavity exam, the patient has an obvious cavity and deficit in the resection area of the r effects of therapy. I told him that the radiation treatment is rather morbid during treatment. His side effects will include pain within the oral cavity, difficulty swallowing, loss of taste, xerostomia, fatigue, and skin reaction.   These side effects wi any radiation until the first week of February or thereabout. In the interim, he will see his dentist and get final clearance.   Once a few more weeks have passed, we will see him back for simulation and would then proceed with eventual treatment as detail

## 2018-01-15 ENCOUNTER — OFFICE VISIT (OUTPATIENT)
Dept: OTOLARYNGOLOGY | Facility: CLINIC | Age: 58
End: 2018-01-15

## 2018-01-15 VITALS
HEIGHT: 71 IN | TEMPERATURE: 98 F | BODY MASS INDEX: 26.6 KG/M2 | WEIGHT: 190 LBS | SYSTOLIC BLOOD PRESSURE: 160 MMHG | DIASTOLIC BLOOD PRESSURE: 90 MMHG

## 2018-01-15 DIAGNOSIS — C09.9 PRIMARY TONSILLAR SQUAMOUS CELL CARCINOMA (HCC): Primary | ICD-10-CM

## 2018-01-15 PROCEDURE — 99212 OFFICE O/P EST SF 10 MIN: CPT | Performed by: OTOLARYNGOLOGY

## 2018-01-15 PROCEDURE — 99024 POSTOP FOLLOW-UP VISIT: CPT | Performed by: OTOLARYNGOLOGY

## 2018-01-15 NOTE — PROGRESS NOTES
Smith Mayer is a 65year old male. Patient presents with:  Post-Op: Tonsillectomy, radical resection of right tonsil      HISTORY OF PRESENT ILLNESS  11/29/2017  Patient prevents for evaluation of a suspected right throat mass.   After his girlfrien Sexual activity: Not Currently     Partners with: Female        Family History   Problem Relation Age of Onset   • Cancer Mother      uterine       Past Medical History:   Diagnosis Date   • Cancer ([facility] [address]     squamous cell right tonsil   • Decorative t Canal - Right: Normal, Left: Normal. TM - Right: Normal, Left: Normal.   Skin Normal Inspection - Normal.        Lymph Detail Normal Submental. Submandibular. Anterior cervical. Posterior cervical. Supraclavicular.         Nose/Mouth/Throat abNormal Externa

## 2018-01-18 ENCOUNTER — TELEPHONE (OUTPATIENT)
Dept: PREOP | Facility: HOSPITAL | Age: 58
End: 2018-01-18

## 2018-01-20 NOTE — TELEPHONE ENCOUNTER
Pt's LOV 1/15/18:    1. T2N0 scca of right tonsil    Pt is requesting refill of celecoxib 200 mg oral cap. Dr. Daphne Hawthorne, please advise.

## 2018-01-20 NOTE — TELEPHONE ENCOUNTER
From: Dyan Harvey  Sent: 1/18/2018 4:44 PM CST  Subject: Medication Renewal Request    Ivory Hilton would like a refill of the following medications:     celecoxib 200 MG Oral Cap Antonia Wilhelm MD]    Preferred pharmacy: [facility]

## 2018-01-22 RX ORDER — CELECOXIB 200 MG/1
CAPSULE ORAL
Qty: 60 CAPSULE | Refills: 3 | Status: SHIPPED
Start: 2018-01-22 | End: 2018-01-26

## 2018-01-25 ENCOUNTER — TELEPHONE (OUTPATIENT)
Dept: GASTROENTEROLOGY | Facility: CLINIC | Age: 58
End: 2018-01-25

## 2018-01-25 PROCEDURE — 77399 UNLISTED PX MED RADJ PHYSICS: CPT | Performed by: RADIOLOGY

## 2018-01-25 PROCEDURE — 77334 RADIATION TREATMENT AID(S): CPT | Performed by: RADIOLOGY

## 2018-01-25 NOTE — TELEPHONE ENCOUNTER
Nursing: pt was scheduled for a screening colon. He had no lower GI symptoms at the time. Ideally I would hold off colonoscopy until after therapy is completed, pending prognosis. Pt would likely not well tolerate prep during treatment.

## 2018-01-25 NOTE — TELEPHONE ENCOUNTER
Pt contacted and reviewed [facility] message below, he was agreeable to cancelling for now and will call back once his radiation is completed to reschedule his CLN. No further questions/concerns at this time.     Pt removed from provider schedule and [facility]C cancellat

## 2018-01-25 NOTE — TELEPHONE ENCOUNTER
I have not seen this patient. It is a patient of Sidra's so please ask her.     Thanks    Bijan Stroud MD  [address] [facility] - Gastroenterology  1/25/2018  12:04 PM

## 2018-01-25 NOTE — TELEPHONE ENCOUNTER
Pt states that he is scheduled for 2/7/18 colonoscopy but he is due to start radiation on 2/5/18. Should pt postpone colonoscopy until April when radiation is completed? Please call.

## 2018-01-26 RX ORDER — CELECOXIB 200 MG/1
CAPSULE ORAL
Qty: 60 CAPSULE | Refills: 3 | Status: SHIPPED | OUTPATIENT
Start: 2018-01-26 | End: 2018-10-11 | Stop reason: ALTCHOICE

## 2018-01-26 NOTE — TELEPHONE ENCOUNTER
Informed pt no pharmacy on file. Updated pt's pharmacy on file. Rx for Celecoxib 200 mg sent to pharmacy. Pt aware.

## 2018-01-31 PROCEDURE — 77338 DESIGN MLC DEVICE FOR IMRT: CPT | Performed by: RADIOLOGY

## 2018-01-31 PROCEDURE — 77300 RADIATION THERAPY DOSE PLAN: CPT | Performed by: RADIOLOGY

## 2018-01-31 PROCEDURE — 77301 RADIOTHERAPY DOSE PLAN IMRT: CPT | Performed by: RADIOLOGY

## 2018-02-01 ENCOUNTER — APPOINTMENT (OUTPATIENT)
Dept: RADIATION ONCOLOGY | Facility: HOSPITAL | Age: 58
End: 2018-02-01
Attending: RADIOLOGY
Payer: COMMERCIAL

## 2018-02-06 ENCOUNTER — OFFICE VISIT (OUTPATIENT)
Dept: RADIATION ONCOLOGY | Facility: HOSPITAL | Age: 58
End: 2018-02-06
Attending: RADIOLOGY
Payer: COMMERCIAL

## 2018-02-06 VITALS
SYSTOLIC BLOOD PRESSURE: 164 MMHG | BODY MASS INDEX: 26.88 KG/M2 | WEIGHT: 192 LBS | HEART RATE: 82 BPM | HEIGHT: 71 IN | RESPIRATION RATE: 16 BRPM | DIASTOLIC BLOOD PRESSURE: 101 MMHG

## 2018-02-06 DIAGNOSIS — C09.9 RIGHT TONSILLAR SQUAMOUS CELL CARCINOMA (HCC): Primary | ICD-10-CM

## 2018-02-06 PROCEDURE — 77386 HC IMRT COMPLEX: CPT | Performed by: RADIOLOGY

## 2018-02-06 NOTE — PROGRESS NOTES
[facility] Radiation Treatment Management Note 1-5    Patient:  German Caballero  Age:  65year old  Visit Diagnosis:    1.  Right tonsillar squamous cell carcinoma (HCC)      Primary Rad/Onc:  Dr. Armstrong Shadow    Site Delivered D

## 2018-02-07 PROCEDURE — 77386 HC IMRT COMPLEX: CPT | Performed by: RADIOLOGY

## 2018-02-08 ENCOUNTER — DIETICIAN VISIT (OUTPATIENT)
Dept: NUTRITION | Facility: HOSPITAL | Age: 58
End: 2018-02-08

## 2018-02-08 VITALS — WEIGHT: 193.19 LBS | BODY MASS INDEX: 27 KG/M2

## 2018-02-08 PROCEDURE — 77386 HC IMRT COMPLEX: CPT | Performed by: RADIOLOGY

## 2018-02-08 NOTE — PROGRESS NOTES
Oncology Nutrition Assessment    Ht Readings from Last 1 Encounters:  02/06/18 : 180.3 cm (5' 11\")      Wt Readings from Last 1 Encounters:  02/08/18 : 87.6 kg (193 lb 3.2 oz)    BMI Calculated: Body mass index is 26.95 kg/m². Weight History:   Wt Readin Nutritional Considerations for People with Head-Neck Cancer and High Calorie - High Protein Nutrition Therapy      Fozia Hwang RD, LDN   Clinical Dietitian [id number]

## 2018-02-09 PROCEDURE — 77386 HC IMRT COMPLEX: CPT | Performed by: RADIOLOGY

## 2018-02-09 PROCEDURE — 77336 RADIATION PHYSICS CONSULT: CPT | Performed by: RADIOLOGY

## 2018-02-12 PROCEDURE — 77386 HC IMRT COMPLEX: CPT | Performed by: RADIOLOGY

## 2018-02-13 ENCOUNTER — OFFICE VISIT (OUTPATIENT)
Dept: RADIATION ONCOLOGY | Facility: HOSPITAL | Age: 58
End: 2018-02-13
Attending: RADIOLOGY
Payer: COMMERCIAL

## 2018-02-13 VITALS
BODY MASS INDEX: 27.3 KG/M2 | HEIGHT: 71 IN | WEIGHT: 195 LBS | DIASTOLIC BLOOD PRESSURE: 101 MMHG | RESPIRATION RATE: 16 BRPM | HEART RATE: 76 BPM | SYSTOLIC BLOOD PRESSURE: 155 MMHG

## 2018-02-13 DIAGNOSIS — C09.9 RIGHT TONSILLAR SQUAMOUS CELL CARCINOMA (HCC): Primary | ICD-10-CM

## 2018-02-13 PROCEDURE — 77386 HC IMRT COMPLEX: CPT | Performed by: RADIOLOGY

## 2018-02-13 NOTE — PROGRESS NOTES
[facility] Radiation Treatment Management Note 6-10    Patient:  Loretta Gonzales  Age:  65year old  Visit Diagnosis:    1.  Right tonsillar squamous cell carcinoma (HCC)      Primary Rad/Onc:  Dr. Kamala Flores    Site Delivered

## 2018-02-14 PROCEDURE — 77386 HC IMRT COMPLEX: CPT | Performed by: RADIOLOGY

## 2018-02-15 ENCOUNTER — DIETICIAN VISIT (OUTPATIENT)
Dept: NUTRITION | Facility: HOSPITAL | Age: 58
End: 2018-02-15

## 2018-02-15 VITALS — WEIGHT: 193.81 LBS | BODY MASS INDEX: 27 KG/M2

## 2018-02-15 PROCEDURE — 77386 HC IMRT COMPLEX: CPT | Performed by: RADIOLOGY

## 2018-02-15 NOTE — PROGRESS NOTES
Ht Readings from Last 1 Encounters:  02/13/18 : 180.3 cm (5' 11\")      Wt Readings from Last 1 Encounters:  02/15/18 : 87.9 kg (193 lb 12.8 oz)    BMI Calculated: Body mass index is 27.03 kg/m². Weight History:   Wt Readings from Last 10 Encounters:  0

## 2018-02-16 PROCEDURE — 77336 RADIATION PHYSICS CONSULT: CPT | Performed by: RADIOLOGY

## 2018-02-16 PROCEDURE — 77386 HC IMRT COMPLEX: CPT | Performed by: RADIOLOGY

## 2018-02-19 PROCEDURE — 77386 HC IMRT COMPLEX: CPT | Performed by: RADIOLOGY

## 2018-02-20 ENCOUNTER — OFFICE VISIT (OUTPATIENT)
Dept: RADIATION ONCOLOGY | Facility: HOSPITAL | Age: 58
End: 2018-02-20
Attending: RADIOLOGY
Payer: COMMERCIAL

## 2018-02-20 VITALS
RESPIRATION RATE: 16 BRPM | DIASTOLIC BLOOD PRESSURE: 106 MMHG | HEIGHT: 71 IN | TEMPERATURE: 99 F | WEIGHT: 190.63 LBS | HEART RATE: 77 BPM | BODY MASS INDEX: 26.69 KG/M2 | SYSTOLIC BLOOD PRESSURE: 149 MMHG

## 2018-02-20 DIAGNOSIS — C09.9 RIGHT TONSILLAR SQUAMOUS CELL CARCINOMA (HCC): Primary | ICD-10-CM

## 2018-02-20 PROCEDURE — 77386 HC IMRT COMPLEX: CPT | Performed by: RADIOLOGY

## 2018-02-20 NOTE — PROGRESS NOTES
[facility] Radiation Treatment Management Note 11-15    Patient:  Amado Bae  Age:  65year old  Visit Diagnosis:    1.  Right tonsillar squamous cell carcinoma (HCC)      Primary Rad/Onc:  Dr. Victor M Harrell    Site Delivered

## 2018-02-21 ENCOUNTER — NURSE ONLY (OUTPATIENT)
Dept: RADIATION ONCOLOGY | Facility: HOSPITAL | Age: 58
End: 2018-02-21

## 2018-02-21 VITALS — DIASTOLIC BLOOD PRESSURE: 94 MMHG | SYSTOLIC BLOOD PRESSURE: 151 MMHG

## 2018-02-21 PROCEDURE — 77386 HC IMRT COMPLEX: CPT | Performed by: RADIOLOGY

## 2018-02-22 ENCOUNTER — DIETICIAN VISIT (OUTPATIENT)
Dept: NUTRITION | Facility: HOSPITAL | Age: 58
End: 2018-02-22

## 2018-02-22 VITALS — WEIGHT: 193.19 LBS | BODY MASS INDEX: 27 KG/M2

## 2018-02-22 PROCEDURE — 77386 HC IMRT COMPLEX: CPT | Performed by: RADIOLOGY

## 2018-02-22 NOTE — PROGRESS NOTES
Ht Readings from Last 1 Encounters:  02/20/18 : 180.3 cm (5' 11\")      Wt Readings from Last 1 Encounters:  02/22/18 : 87.6 kg (193 lb 3.2 oz)    BMI Calculated: Body mass index is 26.95 kg/m². Weight History:   Wt Readings from Last 10 Encounters:  02 management of treatment side effects via dietary measures  Handouts Provided: 2/8/18  Nutritional Considerations for People with Head-Neck Cancer and High Calorie - High Protein Nutrition Therapy      Zeyad Herman RD, LDN   Clinical Dietitian [id number]

## 2018-02-23 PROCEDURE — 77336 RADIATION PHYSICS CONSULT: CPT | Performed by: RADIOLOGY

## 2018-02-23 PROCEDURE — 77386 HC IMRT COMPLEX: CPT | Performed by: RADIOLOGY

## 2018-02-26 PROCEDURE — 77386 HC IMRT COMPLEX: CPT | Performed by: RADIOLOGY

## 2018-02-27 ENCOUNTER — OFFICE VISIT (OUTPATIENT)
Dept: RADIATION ONCOLOGY | Facility: HOSPITAL | Age: 58
End: 2018-02-27
Attending: RADIOLOGY
Payer: COMMERCIAL

## 2018-02-27 VITALS
SYSTOLIC BLOOD PRESSURE: 138 MMHG | BODY MASS INDEX: 27.13 KG/M2 | RESPIRATION RATE: 16 BRPM | DIASTOLIC BLOOD PRESSURE: 84 MMHG | HEART RATE: 69 BPM | HEIGHT: 71 IN | WEIGHT: 193.81 LBS

## 2018-02-27 DIAGNOSIS — C09.9 RIGHT TONSILLAR SQUAMOUS CELL CARCINOMA (HCC): Primary | ICD-10-CM

## 2018-02-27 PROCEDURE — 77386 HC IMRT COMPLEX: CPT | Performed by: RADIOLOGY

## 2018-02-27 NOTE — PROGRESS NOTES
[facility] Radiation Treatment Management Note 16-20    Patient:  Davey Lozano  Age:  65year old  Visit Diagnosis:    1.  Right tonsillar squamous cell carcinoma (HCC)      Primary Rad/Onc:  Dr. Bossman Rousseau    Site Delivered

## 2018-02-28 PROCEDURE — 77386 HC IMRT COMPLEX: CPT | Performed by: RADIOLOGY

## 2018-03-01 ENCOUNTER — APPOINTMENT (OUTPATIENT)
Dept: RADIATION ONCOLOGY | Facility: HOSPITAL | Age: 58
End: 2018-03-01
Attending: RADIOLOGY
Payer: COMMERCIAL

## 2018-03-01 ENCOUNTER — DIETICIAN VISIT (OUTPATIENT)
Dept: NUTRITION | Facility: HOSPITAL | Age: 58
End: 2018-03-01

## 2018-03-01 PROCEDURE — 77386 HC IMRT COMPLEX: CPT | Performed by: RADIOLOGY

## 2018-03-01 NOTE — PROGRESS NOTES
Brief Nutrition Note:    Saw pt very briefly before treatment today because I had a conflict with seeing pt after his treatment today. MD/RN notes reviewed. Pt reports no changes. Drinking 2 boost per day and \"hanging\" in there. Pt looked well.   Offered

## 2018-03-02 PROCEDURE — 77336 RADIATION PHYSICS CONSULT: CPT | Performed by: RADIOLOGY

## 2018-03-02 PROCEDURE — 77386 HC IMRT COMPLEX: CPT | Performed by: RADIOLOGY

## 2018-03-05 PROCEDURE — 77386 HC IMRT COMPLEX: CPT | Performed by: RADIOLOGY

## 2018-03-06 ENCOUNTER — OFFICE VISIT (OUTPATIENT)
Dept: RADIATION ONCOLOGY | Facility: HOSPITAL | Age: 58
End: 2018-03-06
Attending: RADIOLOGY
Payer: COMMERCIAL

## 2018-03-06 VITALS
TEMPERATURE: 98 F | RESPIRATION RATE: 16 BRPM | BODY MASS INDEX: 26.6 KG/M2 | WEIGHT: 190 LBS | HEIGHT: 71 IN | SYSTOLIC BLOOD PRESSURE: 156 MMHG | DIASTOLIC BLOOD PRESSURE: 112 MMHG | HEART RATE: 97 BPM

## 2018-03-06 DIAGNOSIS — C09.9 RIGHT TONSILLAR SQUAMOUS CELL CARCINOMA (HCC): Primary | ICD-10-CM

## 2018-03-06 PROCEDURE — 77386 HC IMRT COMPLEX: CPT | Performed by: RADIOLOGY

## 2018-03-06 NOTE — PROGRESS NOTES
[facility] Radiation Treatment Management Note 21-25    Patient:  Pasha George  Age:  65year old  Visit Diagnosis:    1.  Right tonsillar squamous cell carcinoma (HCC)      Primary Rad/Onc:  Dr. Oliver Alves    Site Delivered reviewed:   Yes    Assessment/Plan:    Continue radiotherapy per plan    Next visit:  1 week    Dr. Kamilla Camejo

## 2018-03-07 PROCEDURE — 77386 HC IMRT COMPLEX: CPT | Performed by: RADIOLOGY

## 2018-03-08 ENCOUNTER — DIETICIAN VISIT (OUTPATIENT)
Dept: NUTRITION | Facility: HOSPITAL | Age: 58
End: 2018-03-08

## 2018-03-08 VITALS — BODY MASS INDEX: 26 KG/M2 | WEIGHT: 189 LBS

## 2018-03-08 PROCEDURE — 77386 HC IMRT COMPLEX: CPT | Performed by: RADIOLOGY

## 2018-03-08 NOTE — PROGRESS NOTES
Ht Readings from Last 1 Encounters:  03/06/18 : 180.3 cm (5' 11\")      Wt Readings from Last 1 Encounters:  03/08/18 : 85.7 kg (189 lb)    BMI Calculated: Body mass index is 26.36 kg/m². Weight History:   Wt Readings from Last 10 Encounters:  03/08/18 adequate--drinking 3 supplements per day and very soft/easy to chew foods. Takes pt a long time to eat. Denies throat soreness, only soreness on right in mouth. Fluid intake good. Tired, but managing.   CPM    Nutritional Goals:  maintain weight within 5%

## 2018-03-09 PROCEDURE — 77336 RADIATION PHYSICS CONSULT: CPT | Performed by: RADIOLOGY

## 2018-03-09 PROCEDURE — 77386 HC IMRT COMPLEX: CPT | Performed by: RADIOLOGY

## 2018-03-12 PROCEDURE — 77336 RADIATION PHYSICS CONSULT: CPT | Performed by: RADIOLOGY

## 2018-03-12 PROCEDURE — 77386 HC IMRT COMPLEX: CPT | Performed by: RADIOLOGY

## 2018-03-13 ENCOUNTER — OFFICE VISIT (OUTPATIENT)
Dept: RADIATION ONCOLOGY | Facility: HOSPITAL | Age: 58
End: 2018-03-13
Attending: RADIOLOGY
Payer: COMMERCIAL

## 2018-03-13 VITALS
RESPIRATION RATE: 18 BRPM | BODY MASS INDEX: 26.35 KG/M2 | HEART RATE: 75 BPM | WEIGHT: 188.19 LBS | DIASTOLIC BLOOD PRESSURE: 102 MMHG | HEIGHT: 71 IN | SYSTOLIC BLOOD PRESSURE: 154 MMHG

## 2018-03-13 DIAGNOSIS — C09.9 RIGHT TONSILLAR SQUAMOUS CELL CARCINOMA (HCC): Primary | ICD-10-CM

## 2018-03-13 PROCEDURE — 77386 HC IMRT COMPLEX: CPT | Performed by: RADIOLOGY

## 2018-03-13 RX ORDER — HYDROCODONE BITARTRATE AND ACETAMINOPHEN 5; 325 MG/1; MG/1
1-2 TABLET ORAL EVERY 4 HOURS PRN
Qty: 50 TABLET | Refills: 0 | Status: SHIPPED | OUTPATIENT
Start: 2018-03-13 | End: 2018-03-19

## 2018-03-13 NOTE — PROGRESS NOTES
[facility] Radiation Treatment Management Note 26-30    Patient:  Scott Taylor  Age:  65year old  Visit Diagnosis:    1.  Right tonsillar squamous cell carcinoma (HCC)      Primary Rad/Onc:  Dr. Bae Listen    Site Delivered

## 2018-03-14 PROCEDURE — 77386 HC IMRT COMPLEX: CPT | Performed by: RADIOLOGY

## 2018-03-15 ENCOUNTER — DIETICIAN VISIT (OUTPATIENT)
Dept: NUTRITION | Facility: HOSPITAL | Age: 58
End: 2018-03-15

## 2018-03-15 VITALS — BODY MASS INDEX: 26 KG/M2 | WEIGHT: 186 LBS

## 2018-03-15 PROCEDURE — 77386 HC IMRT COMPLEX: CPT | Performed by: RADIOLOGY

## 2018-03-15 NOTE — PROGRESS NOTES
Ht Readings from Last 1 Encounters:  03/13/18 : 180.3 cm (5' 11\")      Wt Readings from Last 1 Encounters:  03/15/18 : 84.4 kg (186 lb)    BMI Calculated: Body mass index is 25.94 kg/m². Weight History:   Wt Readings from Last 10 Encounters:  03/15/18 adequate--drinking 3 supplements per day and very soft/easy to chew foods. Takes pt a long time to eat. Denies throat soreness, only soreness on right in mouth. Fluid intake good. Tired, but managing. CPM    3/15--wt drop over the past week.  Eating is mu

## 2018-03-16 PROCEDURE — 77386 HC IMRT COMPLEX: CPT | Performed by: RADIOLOGY

## 2018-03-16 PROCEDURE — 77336 RADIATION PHYSICS CONSULT: CPT | Performed by: RADIOLOGY

## 2018-03-19 ENCOUNTER — OFFICE VISIT (OUTPATIENT)
Dept: RADIATION ONCOLOGY | Facility: HOSPITAL | Age: 58
End: 2018-03-19
Attending: RADIOLOGY
Payer: COMMERCIAL

## 2018-03-19 VITALS
HEIGHT: 71 IN | RESPIRATION RATE: 16 BRPM | WEIGHT: 187.63 LBS | HEART RATE: 65 BPM | SYSTOLIC BLOOD PRESSURE: 156 MMHG | BODY MASS INDEX: 26.27 KG/M2 | DIASTOLIC BLOOD PRESSURE: 94 MMHG | TEMPERATURE: 98 F

## 2018-03-19 DIAGNOSIS — C09.9 RIGHT TONSILLAR SQUAMOUS CELL CARCINOMA (HCC): Primary | ICD-10-CM

## 2018-03-19 PROCEDURE — 77386 HC IMRT COMPLEX: CPT | Performed by: RADIOLOGY

## 2018-03-19 RX ORDER — HYDROCODONE BITARTRATE AND ACETAMINOPHEN 5; 325 MG/1; MG/1
1-2 TABLET ORAL EVERY 4 HOURS PRN
Qty: 50 TABLET | Refills: 0 | Status: SHIPPED | OUTPATIENT
Start: 2018-03-19 | End: 2018-06-15

## 2018-03-19 NOTE — PATIENT INSTRUCTIONS
Please follow up with Dr Mino Benitez 4/3/18 @ 1030am. Please call Rafy Res with any schedule changes or questions or concerns @ [phone].

## 2018-03-19 NOTE — PROGRESS NOTES
[facility] Radiation Treatment Management Note 26-30    Patient:  Harini Long  Age:  65year old  Visit Diagnosis:    1.  Right tonsillar squamous cell carcinoma (HCC)      Primary Rad/Onc:  Dr. Jayesh Stevens    Site Delivered

## 2018-03-22 NOTE — PROGRESS NOTES
[facility]    PATIENT'S NAME: Abdirahman Brush, [address]   RADIATION ONCOLOGIST: Sandip Fuentes.  Jeanmarie Melara MD   PATIENT ACCOUNT #: [de-identified] LOCATION: [address] [place] [address] RECORD #: [id number] YOB: 1960   DATE: 03/19/2018       RADIATION ONC he underwent a radical right tonsillectomy and resection of tumor. The final pathology came back as squamous cell carcinoma. The tumor did extend to the cauterized resection margin. It was grade 3 and measured 2.7 cm in greatest dimension.   It was then listed above. He otherwise completed treatment while continuing to eat and did not require nutritional support in the way of a G-tube. He worked throughout treatment, but did have to cut back on his hours by the end of therapy.   His weight remained reaso

## 2018-03-23 PROCEDURE — 77336 RADIATION PHYSICS CONSULT: CPT | Performed by: RADIOLOGY

## 2018-04-03 ENCOUNTER — OFFICE VISIT (OUTPATIENT)
Dept: RADIATION ONCOLOGY | Facility: HOSPITAL | Age: 58
End: 2018-04-03
Attending: RADIOLOGY
Payer: COMMERCIAL

## 2018-04-03 VITALS
SYSTOLIC BLOOD PRESSURE: 151 MMHG | HEART RATE: 83 BPM | RESPIRATION RATE: 16 BRPM | BODY MASS INDEX: 25.51 KG/M2 | WEIGHT: 182.19 LBS | DIASTOLIC BLOOD PRESSURE: 100 MMHG | TEMPERATURE: 98 F | HEIGHT: 71 IN

## 2018-04-03 DIAGNOSIS — C09.9 RIGHT TONSILLAR SQUAMOUS CELL CARCINOMA (HCC): Primary | ICD-10-CM

## 2018-04-03 PROCEDURE — 99211 OFF/OP EST MAY X REQ PHY/QHP: CPT

## 2018-04-03 NOTE — PROGRESS NOTES
Pt seen in 2 week follow up with Dr Santiago Rossi. States he is feeling better overall, able to eat \"regular food\", ie, pizza, griffith, ham for Easter. Mild soreness R posterior buccal mucosa.  Wt loss 5# Pt states \"I got sick of boost.\" CT neck prdered for t

## 2018-04-09 NOTE — PROGRESS NOTES
[facility]    PATIENT'S NAME: Mikayla Cantu, [address]   RADIATION ONCOLOGIST: Val Fuentes.  Sandra West MD   PATIENT ACCOUNT #: [de-identified] LOCATION: [address] [place] [address] RECORD #: [id number] YOB: 1960   FOLLOW-UP DATE: 04/03/2018       RAD cGy in 200 cGy fractions, completing on 03/19/2018. The patient presents today for his first followup visit since completing his radiation. Overall, he is recovering reasonably well.   He has pain only with eating and speaking and no pain otherwise at res me at any time. Dictated By Jonathan Abdalla MD  d: 04/09/2018 11:08:32  t: 04/09/2018 11:21:17  [facility] [id number]  NAD/    cc: MD John Whitney MD

## 2018-04-30 RX ORDER — METOPROLOL SUCCINATE 50 MG/1
TABLET, EXTENDED RELEASE ORAL
Qty: 30 TABLET | Refills: 1 | Status: SHIPPED | OUTPATIENT
Start: 2018-04-30 | End: 2018-06-15

## 2018-04-30 NOTE — TELEPHONE ENCOUNTER
Please assist patient scheduling follow up appointment withDr Boni Waldrop prior to additional refills.   Thanks

## 2018-05-10 ENCOUNTER — APPOINTMENT (OUTPATIENT)
Dept: HEMATOLOGY/ONCOLOGY | Facility: HOSPITAL | Age: 58
End: 2018-05-10
Attending: INTERNAL MEDICINE
Payer: COMMERCIAL

## 2018-05-22 ENCOUNTER — HOSPITAL ENCOUNTER (OUTPATIENT)
Dept: CT IMAGING | Facility: HOSPITAL | Age: 58
Discharge: HOME | End: 2018-05-22
Attending: RADIOLOGY
Payer: COMMERCIAL

## 2018-05-22 DIAGNOSIS — C09.9 RIGHT TONSILLAR SQUAMOUS CELL CARCINOMA (HCC): ICD-10-CM

## 2018-05-22 PROCEDURE — 70491 CT SOFT TISSUE NECK W/DYE: CPT | Performed by: RADIOLOGY

## 2018-05-22 PROCEDURE — 82565 ASSAY OF CREATININE: CPT

## 2018-05-25 ENCOUNTER — OFFICE VISIT (OUTPATIENT)
Dept: RADIATION ONCOLOGY | Facility: HOSPITAL | Age: 58
End: 2018-05-25
Attending: RADIOLOGY
Payer: COMMERCIAL

## 2018-05-25 VITALS
DIASTOLIC BLOOD PRESSURE: 97 MMHG | WEIGHT: 179.38 LBS | RESPIRATION RATE: 16 BRPM | SYSTOLIC BLOOD PRESSURE: 138 MMHG | BODY MASS INDEX: 25.11 KG/M2 | HEART RATE: 79 BPM | HEIGHT: 71 IN

## 2018-05-25 DIAGNOSIS — C09.9 RIGHT TONSILLAR SQUAMOUS CELL CARCINOMA (HCC): Primary | ICD-10-CM

## 2018-05-25 PROCEDURE — 99211 OFF/OP EST MAY X REQ PHY/QHP: CPT

## 2018-05-25 NOTE — PROGRESS NOTES
[facility]    PATIENT'S NAME: Charu Mason, [address]   RADIATION ONCOLOGIST: Meche Heaton.  Olga Fernandez MD   PATIENT ACCOUNT #: [de-identified] LOCATION: [address] [place] [address] RECORD #: [id number] YOB: 1960   FOLLOW-UP DATE: 05/25/2018       RAD some xerostomia in the morning, but this does not bother him throughout the day. He continues using a fluoride rinse and sees his dentist routinely. The remainder of his medical health is stable, and he has no other complaints.     A CT scan was done on 0 this patient. If there should be any questions regarding the radiotherapy, please feel free to contact me at any time. Dictated By Carine Freire MD  d: 05/25/2018 11:09:47  t: 05/25/2018 16:11:11  [facility] [id number]  NAD/    cc: Seth Holter.  RUBINA

## 2018-05-25 NOTE — PROGRESS NOTES
Pt seen in following up with Dr Alejandrina Peralta s/p CT scan from 5/22 which showed an excellent response. Pt c/o continues xerostomia, and very diminished taste buds. Eating regular foods, states he had a steak sandwich this am prior to his radiation visit.  In g

## 2018-05-25 NOTE — PATIENT INSTRUCTIONS
Follow up with Dr Sarthak Madrid 8/23 @ [facility]. Call [facility] with any schedule changes at [phone].

## 2018-06-15 ENCOUNTER — OFFICE VISIT (OUTPATIENT)
Dept: OTOLARYNGOLOGY | Facility: CLINIC | Age: 58
End: 2018-06-15

## 2018-06-15 ENCOUNTER — OFFICE VISIT (OUTPATIENT)
Dept: INTERNAL MEDICINE CLINIC | Facility: CLINIC | Age: 58
End: 2018-06-15

## 2018-06-15 VITALS
HEART RATE: 80 BPM | BODY MASS INDEX: 24.92 KG/M2 | TEMPERATURE: 98 F | DIASTOLIC BLOOD PRESSURE: 97 MMHG | WEIGHT: 178 LBS | HEIGHT: 71 IN | SYSTOLIC BLOOD PRESSURE: 144 MMHG

## 2018-06-15 VITALS
HEART RATE: 80 BPM | HEIGHT: 71 IN | BODY MASS INDEX: 24.78 KG/M2 | DIASTOLIC BLOOD PRESSURE: 88 MMHG | SYSTOLIC BLOOD PRESSURE: 146 MMHG | WEIGHT: 177 LBS

## 2018-06-15 DIAGNOSIS — C09.9 PRIMARY TONSILLAR SQUAMOUS CELL CARCINOMA (HCC): Primary | ICD-10-CM

## 2018-06-15 DIAGNOSIS — I10 ESSENTIAL HYPERTENSION: Primary | ICD-10-CM

## 2018-06-15 PROCEDURE — 99214 OFFICE O/P EST MOD 30 MIN: CPT | Performed by: OTOLARYNGOLOGY

## 2018-06-15 PROCEDURE — 99212 OFFICE O/P EST SF 10 MIN: CPT | Performed by: OTOLARYNGOLOGY

## 2018-06-15 PROCEDURE — 99212 OFFICE O/P EST SF 10 MIN: CPT | Performed by: INTERNAL MEDICINE

## 2018-06-15 PROCEDURE — 31575 DIAGNOSTIC LARYNGOSCOPY: CPT | Performed by: OTOLARYNGOLOGY

## 2018-06-15 PROCEDURE — 99213 OFFICE O/P EST LOW 20 MIN: CPT | Performed by: INTERNAL MEDICINE

## 2018-06-15 RX ORDER — METOPROLOL SUCCINATE 50 MG/1
TABLET, EXTENDED RELEASE ORAL
Qty: 30 TABLET | Refills: 5 | Status: SHIPPED | OUTPATIENT
Start: 2018-06-15 | End: 2019-05-04

## 2018-06-15 NOTE — PROGRESS NOTES
Mehul Arevalo is a 65year old male. Patient presents with:  Blood Pressure    HPI:   Mr. Hema Dubois presents this afternoon for follow-up of hypertension, requesting a refill of metoprolol.     Since his last visit with me last November, he was diagnose control. Recommend return visit with usual PCP in about 2 months for blood pressure check on medication. The patient indicates understanding of these issues and agrees to the plan. The patient is asked to return in 2 months.     Edward Acuna MD  6/

## 2018-06-15 NOTE — PROGRESS NOTES
Anahi Eduardo is a 65year old male. Patient presents with: Tonsil Problem: change in voice, finished with radation      HISTORY OF PRESENT ILLNESS  11/29/2017  Patient prevents for evaluation of a suspected right throat mass.   After his girlfriends 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 He saw Dr. Agustin Martinez who obtained a CT scan of the neck on 12/05/2017.   This demonstrated asymmetric enlargement of the right palatine tonsil weight loss. ENMT Negative Drooling. Eyes Negative Blurred vision and vision changes. Respiratory Negative Dyspnea and wheezing. Cardio Negative Chest pain, irregular heartbeat/palpitations and syncope. GI Negative Abdominal pain and diarrhea. potential side effects. The patient stated that they understood and wished for me to proceed. Topical pontocaine and neosynephrine was instilled into the bilateral nasal cavities.  The flexible fiberoptic laryngoscope was threaded into the left nasal cavity

## 2018-06-19 RX ORDER — CELECOXIB 200 MG/1
CAPSULE ORAL
Qty: 60 CAPSULE | Refills: 3
Start: 2018-06-19

## 2018-08-23 ENCOUNTER — TELEPHONE (OUTPATIENT)
Dept: RADIATION ONCOLOGY | Facility: HOSPITAL | Age: 58
End: 2018-08-23

## 2018-08-23 ENCOUNTER — APPOINTMENT (OUTPATIENT)
Dept: RADIATION ONCOLOGY | Facility: HOSPITAL | Age: 58
End: 2018-08-23
Attending: RADIOLOGY
Payer: COMMERCIAL

## 2018-08-23 ENCOUNTER — NURSE TRIAGE (OUTPATIENT)
Dept: OTHER | Age: 58
End: 2018-08-23

## 2018-08-23 NOTE — TELEPHONE ENCOUNTER
Called pt when he did not arrive for his follow up with Dr Mino Benitez. States \"I totally forgot. \" Rescheduled for tomorrow 8/24 @ 900am.

## 2018-08-24 ENCOUNTER — OFFICE VISIT (OUTPATIENT)
Dept: RADIATION ONCOLOGY | Facility: HOSPITAL | Age: 58
End: 2018-08-24
Attending: RADIOLOGY
Payer: COMMERCIAL

## 2018-08-24 VITALS
WEIGHT: 170.81 LBS | HEIGHT: 71 IN | SYSTOLIC BLOOD PRESSURE: 164 MMHG | BODY MASS INDEX: 23.91 KG/M2 | HEART RATE: 63 BPM | RESPIRATION RATE: 18 BRPM | DIASTOLIC BLOOD PRESSURE: 97 MMHG | TEMPERATURE: 98 F

## 2018-08-24 DIAGNOSIS — E78.1 HIGH TRIGLYCERIDES: ICD-10-CM

## 2018-08-24 DIAGNOSIS — C09.9 RIGHT TONSILLAR SQUAMOUS CELL CARCINOMA (HCC): Primary | ICD-10-CM

## 2018-08-24 DIAGNOSIS — R73.01 ELEVATED FASTING BLOOD SUGAR: ICD-10-CM

## 2018-08-24 PROCEDURE — 99211 OFF/OP EST MAY X REQ PHY/QHP: CPT

## 2018-08-24 NOTE — ED PROVIDER NOTES
Patient Seen in: [facility] AND [facility] Emergency Department    History   Patient presents with:  Eval-P (psychiatric)    Stated Complaint: detox     HPI    52-year-old male with history of significant alcohol abuse and history of hypertension who is had a ra person, place, and time. Appears well-developed. No distress. Head: Normocephalic and atraumatic. Eyes: Conjunctivae are normal. Pupils are equal, round, and reactive to light. Neck: Normal range of motion. Neck supple.   Cardiovascular: Normal rate, Please view results for these tests on the individual orders.    RAINBOW DRAW BLUE   RAINBOW DRAW LAVENDER   RAINBOW DRAW DARK GREEN   RAINBOW DRAW GOLD   RAINBOW DRAW LAVENDER TALL (BNP)       ED Course as of Aug 24 1133  ------------------

## 2018-08-24 NOTE — TELEPHONE ENCOUNTER
Patient currently at OV--b/p remains elevated--please see triage information below and advise    Office Visit  Open      8/24/2018  [address]   Multiple Providers   Radiation Oncology   Head And Neck Cancer   Reason for Visit

## 2018-08-24 NOTE — ED INITIAL ASSESSMENT (HPI)
Pt c/o \"detox from alcohol\". Pt was a daily, heavy drinker. States his last drink was Wednesday. Started vomiting and sweating yesterday + wekaness. Pt was at a doctors office today and was told he was having \"tremors\".  Pt has been having increased blo

## 2018-08-24 NOTE — ED NOTES
Patient states he drinks everyday stopped on Wednesday. Drank extra over the weekend since GF was out of town. Tremors noted.  SOLOMON done

## 2018-08-24 NOTE — PROGRESS NOTES
After Visit Summary   7/26/2017    Jacinto Cruz    MRN: [id number]           Patient Information     Date Of Birth          1983        Visit Information        Provider Department      7/26/2017 1:15 PM Viridiana Rubio MD [facility]        Today's Diagnoses     Encounter for routine adult health examination without abnormal findings    -  1    Episodic tension-type headache, not intractable        Polyneuropathy (H)          Care Instructions      Preventive Health Recommendations  Male Ages 26 - 39    Yearly exam:             See your health care provider every year in order to  o   Review health changes.   o   Discuss preventive care.    o   Review your medicines if your doctor has prescribed any.    You should be tested each year for STDs (sexually transmitted diseases), if you re at risk.     After age 35, talk to your provider about cholesterol testing. If you are at risk for heart disease, have your cholesterol tested at least every 5 years.     If you are at risk for diabetes, you should have a diabetes test (fasting glucose).  Shots: Get a flu shot each year. Get a tetanus shot every 10 years.     Nutrition:    Eat at least 5 servings of fruits and vegetables daily.     Eat whole-grain bread, whole-wheat pasta and brown rice instead of white grains and rice.     Talk to your provider about Calcium and Vitamin D.     Lifestyle    Exercise for at least 150 minutes a week (30 minutes a day, 5 days a week). This will help you control your weight and prevent disease.     Limit alcohol to one drink per day.     No smoking.     Wear sunscreen to prevent skin cancer.     See your dentist every six months for an exam and cleaning.             Follow-ups after your visit        Additional Services     NEUROLOGY ADULT REFERRAL       Your provider has referred you for the following:   Consult at [facility]: [facility] [place]    Please be aware that  Pt seen in 3 month follow up with Dr Ligia Burrows, having completed radiation to the H&N 7/23/18. Pt has generalized tremors, with unsteady gait noted upon getting pt from the cancer center waiting area.  Wt loss 7# States he is not hungry, upon further [employer] "coverage of these services is subject to the terms and limitations of your health insurance plan.  Call member services at your health plan with any benefit or coverage questions.      Please bring the following with you to your appointment:    (1) Any X-Rays, CTs or MRIs which have been performed.  Contact the facility where they were done to arrange for  prior to your scheduled appointment.    (2) List of current medications  (3) This referral request   (4) Any documents/labs given to you for this referral                  Who to contact     If you have questions or need follow up information about today's clinic visit or your schedule please contact [facility] directly at [phone].  Normal or non-critical lab and imaging results will be communicated to you by MyChart, letter or phone within 4 business days after the clinic has received the results. If you do not hear from us within 7 days, please contact the clinic through MyChart or phone. If you have a critical or abnormal lab result, we will notify you by phone as soon as possible.  Submit refill requests through [employer] or call your pharmacy and they will forward the refill request to us. Please allow 3 business days for your refill to be completed.          Additional Information About Your Visit        [employer] Information     [employer] lets you send messages to your doctor, view your test results, renew your prescriptions, schedule appointments and more. To sign up, go to [email or web address] . Click on \"Log in\" on the left side of the screen, which will take you to the Welcome page. Then click on \"Sign up Now\" on the right side of the page.     You will be asked to enter the access code listed below, as well as some personal information. Please follow the directions to create your username and password.     Your access code is: [email or web address]  Expires: 10/24/2017  1:55 PM     Your access code will  in 90 days. If you need " "help or a new code, please call your [place] clinic or [phone].        Care EveryWhere ID     This is your Care EveryWhere ID. This could be used by other organizations to access your [facility] medical records  [id number]        Your Vitals Were     Pulse Temperature Height BMI (Body Mass Index)          75 98.3  F (36.8  C) (Oral) 5' 9.75\" (1.772 m) 24.13 kg/m2         Blood Pressure from Last 3 Encounters:   07/26/17 136/88   01/11/17 (!) 137/93   03/16/15 138/85    Weight from Last 3 Encounters:   07/26/17 167 lb (75.8 kg)   03/16/15 173 lb (78.5 kg)   03/13/15 173 lb (78.5 kg)              We Performed the Following     CBC with platelets     Comprehensive metabolic panel (BMP + Alb, Alk Phos, ALT, AST, Total. Bili, TP)     Hemoglobin A1c     NEUROLOGY ADULT REFERRAL     Vitamin B12     Vitamin D Deficiency        Primary Care Provider    Physician No Ref-Primary       No address on file        Equal Access to Services     JONATHAN AGUIRRE : Hadii winsome ku hadasho Soomaali, waaxda luqadaha, qaybta kaalmada adeegyabriseyda, shane shelton . So [facility] [phone].    ATENCIÓN: Si habla español, tiene a amado disposición servicios gratuitos de asistencia lingüística. Llame al [phone].    We comply with applicable federal civil rights laws and [place] laws. We do not discriminate on the basis of race, color, national origin, age, disability sex, sexual orientation or gender identity.            Thank you!     Thank you for choosing [facility]  for your care. Our goal is always to provide you with excellent care. Hearing back from our patients is one way we can continue to improve our services. Please take a few minutes to complete the written survey that you may receive in the mail after your visit with us. Thank you!             Your Updated Medication List - Protect others around you: Learn how to safely use, store and throw away your medicines at www.disposemymeds.org.    "       This list is accurate as of: 7/26/17  1:56 PM.  Always use your most recent med list.                   Brand Name Dispense Instructions for use Diagnosis    ADVIL ALLERGY & CONGESTION PO      Take by mouth daily

## 2018-08-24 NOTE — TELEPHONE ENCOUNTER
Currently in ER--staff to f/u tomorrow    ED  Current      8/24/2018 - present  [facility] Emergency Department   Roxi Piper MD   Attending • Treatment team   Alcohol withdrawal syndrome without complication [facility])   Clinical impression

## 2018-08-24 NOTE — TELEPHONE ENCOUNTER
Action Requested: Summary for Provider     []  Critical Lab, Recommendations Needed  [] Need Additional Advice  []   FYI    []   Need Orders  [] Need Medications Sent to Pharmacy  []  Other     SUMMARY: Patient reports current B/p is 161/105; wants to know

## 2018-08-29 NOTE — PROGRESS NOTES
[facility]    PATIENT'S NAME: Charles Martin, [address]   RADIATION ONCOLOGIST: Cecille Villa.  Ana Burgess MD   PATIENT ACCOUNT #: [de-identified] LOCATION: [address] [place] [address] RECORD #: [id number] YOB: 1960   FOLLOW-UP DATE: 08/24/2018       RAD without pain, and his sense of taste is recovering.   His speech and swallowing have returned to normal.  He was scanned on 05/22/2018 and had no evidence of recurrence and saw Dr. Kiya Howell on 06/15/2018, at which time he underwent endoscopy, and there were o no evidence of recurrence either clinically, radiographically, or endoscopically at this time. Obviously, the larger concern at the current time is his alcohol withdrawal.  He is quite tremulous at this time, and his vitals are not great.   The patient w

## 2018-08-30 PROBLEM — F10.230 ALCOHOL WITHDRAWAL SYNDROME WITHOUT COMPLICATION (HCC): Status: ACTIVE | Noted: 2018-08-30

## 2018-08-30 PROBLEM — F10.239 ALCOHOL DEPENDENCE WITH WITHDRAWAL (HCC): Status: ACTIVE | Noted: 2018-08-30

## 2018-08-30 PROBLEM — C09.9 TONSILLAR CANCER (HCC): Status: ACTIVE | Noted: 2018-08-30

## 2018-08-30 PROBLEM — R74.01 TRANSAMINITIS: Status: ACTIVE | Noted: 2018-08-30

## 2018-08-30 PROBLEM — M87.9 OSTEONECROSIS OF LEFT HIP (HCC): Status: ACTIVE | Noted: 2018-08-30

## 2018-08-30 PROBLEM — F10.930 ALCOHOL WITHDRAWAL SYNDROME WITHOUT COMPLICATION (HCC): Status: ACTIVE | Noted: 2018-08-30

## 2018-08-30 PROBLEM — I10 ESSENTIAL HYPERTENSION: Status: ACTIVE | Noted: 2018-08-30

## 2018-08-30 NOTE — ASSESSMENT & PLAN NOTE
BP normal today. Continue to monitor. CPM.  On metoprolol succinate 50 mg daily. Counseled on diet, regular exercise and weight loss. Advised to avoid alcohol.

## 2018-08-30 NOTE — PROGRESS NOTES
Rodney Dong is a 63year old male.   Patient presents with:  ER F/U: patient derick to [address] [place] [address] ER on 8/24/18 for Alcohol withdrawl syndrome      HPI:     HPI  Patient is a 52-year-old male with history of alcohol dependence,  squamous cell carcinoma of the hypertension    He smokes cigars occasionally. Used to smoke 3-4 times a week more than 5 years ago. He is . Currently lives with a girlfriend. Not working currently.           Current Outpatient Prescriptions:  Metoprolol Succinate ER 50 MG Ora Cuff Size: adult)   Pulse 55   Temp 98.1 °F (36.7 °C) (Oral)   Ht 5' 11\" (1.803 m)   Wt 177 lb 8 oz (80.5 kg)   BMI 24.76 kg/m²   Physical Exam   Constitutional: He is oriented to person, place, and time. He appears well-developed and well-nourished.    HE family and friends who encouraged just to drink. Relevant Orders    US LIVER (CPT=76705)       Immune    Tonsillar cancer ([facility] [address]     Tonsillar cancer diagnosed in 12/5/ 2017 status post radical tonsillectomy on 1/5/2018.   Biopsy consistent with sq

## 2018-08-30 NOTE — ASSESSMENT & PLAN NOTE
Long-standing history of alcohol dependence. Regular day-4-5 glasses of vodka. Has been drinking lately. Counseled extensively on abstinence. Advised to focus on lifestyle modification. Involving hobbies and activities.   Eat healthy and exercise regul

## 2018-08-30 NOTE — ASSESSMENT & PLAN NOTE
Slightly elevated LFTs due to chronic alcohol use. , ALT 61, total bili 2, direct bili 0.4  Transaminitis in alcoholic liver disease pattern. Monitor LFTs. Advised to quit alcohol. Order for ultrasound lower placed.   Risk of chronic liver diseas

## 2018-08-30 NOTE — ASSESSMENT & PLAN NOTE
Tonsillar cancer diagnosed in 12/5/ 2017 status post radical tonsillectomy on 1/5/2018. Biopsy consistent with squamous cell carcinoma P 16+. It was grade 3 and measured 2.7 cm in greatest dimension.  It was characterized as a pathologic T2N0.   A PET sca

## 2018-08-30 NOTE — ASSESSMENT & PLAN NOTE
For withdrawal symptoms after binge drinking. Long-standing history of alcohol consumption. [facility] refferal placed.   Couselled extensively including an deleterious effects of alcohol

## 2018-09-05 ENCOUNTER — HOSPITAL ENCOUNTER (OUTPATIENT)
Dept: ULTRASOUND IMAGING | Age: 58
Discharge: HOME | End: 2018-09-05
Attending: INTERNAL MEDICINE
Payer: COMMERCIAL

## 2018-09-05 DIAGNOSIS — F10.230 ALCOHOL WITHDRAWAL SYNDROME WITHOUT COMPLICATION (HCC): ICD-10-CM

## 2018-09-05 DIAGNOSIS — R74.01 TRANSAMINITIS: ICD-10-CM

## 2018-09-05 DIAGNOSIS — F10.230 ALCOHOL DEPENDENCE WITH UNCOMPLICATED WITHDRAWAL (HCC): ICD-10-CM

## 2018-09-05 PROBLEM — K70.0 ALCOHOLIC FATTY LIVER: Status: ACTIVE | Noted: 2018-09-05

## 2018-09-05 PROBLEM — K82.4 GALL BLADDER POLYP: Status: ACTIVE | Noted: 2018-09-05

## 2018-09-05 PROCEDURE — 76705 ECHO EXAM OF ABDOMEN: CPT | Performed by: INTERNAL MEDICINE

## 2018-09-13 ENCOUNTER — OFFICE VISIT (OUTPATIENT)
Dept: INTERNAL MEDICINE CLINIC | Facility: CLINIC | Age: 58
End: 2018-09-13
Payer: COMMERCIAL

## 2018-09-13 VITALS
HEIGHT: 70.5 IN | HEART RATE: 49 BPM | DIASTOLIC BLOOD PRESSURE: 87 MMHG | WEIGHT: 178.38 LBS | BODY MASS INDEX: 25.25 KG/M2 | SYSTOLIC BLOOD PRESSURE: 165 MMHG

## 2018-09-13 DIAGNOSIS — Z12.5 SCREENING FOR PROSTATE CANCER: ICD-10-CM

## 2018-09-13 DIAGNOSIS — Z23 NEED FOR VACCINATION: ICD-10-CM

## 2018-09-13 DIAGNOSIS — I10 ESSENTIAL HYPERTENSION: Primary | ICD-10-CM

## 2018-09-13 DIAGNOSIS — K82.4 GALL BLADDER POLYP: ICD-10-CM

## 2018-09-13 DIAGNOSIS — Z12.11 SCREENING FOR COLON CANCER: ICD-10-CM

## 2018-09-13 DIAGNOSIS — R74.01 TRANSAMINITIS: ICD-10-CM

## 2018-09-13 DIAGNOSIS — K70.0 ALCOHOLIC FATTY LIVER: ICD-10-CM

## 2018-09-13 PROCEDURE — 99215 OFFICE O/P EST HI 40 MIN: CPT | Performed by: INTERNAL MEDICINE

## 2018-09-13 PROCEDURE — 99212 OFFICE O/P EST SF 10 MIN: CPT | Performed by: INTERNAL MEDICINE

## 2018-09-13 PROCEDURE — 90732 PPSV23 VACC 2 YRS+ SUBQ/IM: CPT | Performed by: INTERNAL MEDICINE

## 2018-09-13 PROCEDURE — 90471 IMMUNIZATION ADMIN: CPT | Performed by: INTERNAL MEDICINE

## 2018-09-13 RX ORDER — GABAPENTIN 100 MG/1
CAPSULE ORAL
Refills: 0 | COMMUNITY
Start: 2018-08-30 | End: 2018-10-11

## 2018-09-13 RX ORDER — NALTREXONE HYDROCHLORIDE 50 MG/1
TABLET, FILM COATED ORAL
Refills: 0 | COMMUNITY
Start: 2018-08-30 | End: 2018-10-11 | Stop reason: ALTCHOICE

## 2018-09-13 NOTE — ASSESSMENT & PLAN NOTE
0.52 cm per US liver 9/5. The finding was discussed with the patient. Asymptomatic. We will monitor for now. If gets bigger than 1 cm will consider elective cholecystectomy. He verbalizes understanding.

## 2018-09-13 NOTE — PROGRESS NOTES
Jaswant Randolph is a 63year old male. Patient presents with: Other: pt says he is here for ultrasound results.       HPI:     HPI  Patient is a 64-year-old male with history of alcohol dependence,  squamous cell carcinoma of the tonsil status post res Rfl: 3      Past Medical History:   Diagnosis Date   • Cancer ([facility] [address]     squamous cell right tonsil   • Decorative tattoo    • Essential hypertension    • High blood pressure       Past Surgical History:   Procedure Laterality Date   • Rad resec tonsil/pill in the right tonsillar fossa. Eyes: Conjunctivae are normal.   Neck: Neck supple. No thyromegaly present. Cardiovascular: Normal rate, regular rhythm, normal heart sounds and intact distal pulses.    Pulmonary/Chest: Effort normal and breath sounds norm elective cholecystectomy. He verbalizes understanding. Other    Transaminitis     Slightly elevated LFTs consistent with alcoholic liver disease pattern. ALT of 61, AST of 110 and total bilirubin of 2. Repeat liver function in 3 months.   Coun

## 2018-09-13 NOTE — ASSESSMENT & PLAN NOTE
Transaminitis consistent with alcoholic liver disease pattern. AST of 111 and ALT of 67. Recent ultrasound on 9/5/2018 negative for parenchymal changes of the liver. Counseled extensively on alcohol cessation.   Patient following with psychiatrist Dr Lencho Lo

## 2018-09-13 NOTE — ASSESSMENT & PLAN NOTE
Usually well controlled. Elevated today. Keep a log of blood pressure reading for next visit. Continue metoprolol succinate 50 mg. Titrate if needed if remains elevated.

## 2018-09-13 NOTE — ASSESSMENT & PLAN NOTE
Slightly elevated LFTs consistent with alcoholic liver disease pattern. ALT of 61, AST of 110 and total bilirubin of 2. Repeat liver function in 3 months. Counseled on alcohol abstinence.

## 2018-10-08 ENCOUNTER — LAB ENCOUNTER (OUTPATIENT)
Dept: LAB | Age: 58
End: 2018-10-08
Attending: INTERNAL MEDICINE
Payer: COMMERCIAL

## 2018-10-08 DIAGNOSIS — R74.01 TRANSAMINITIS: ICD-10-CM

## 2018-10-08 DIAGNOSIS — R73.01 ELEVATED FASTING BLOOD SUGAR: ICD-10-CM

## 2018-10-08 DIAGNOSIS — Z12.5 SCREENING FOR PROSTATE CANCER: ICD-10-CM

## 2018-10-08 DIAGNOSIS — D75.89 MACROCYTOSIS: ICD-10-CM

## 2018-10-08 DIAGNOSIS — D75.89 MACROCYTOSIS: Primary | ICD-10-CM

## 2018-10-08 DIAGNOSIS — K70.0 ALCOHOLIC FATTY LIVER: ICD-10-CM

## 2018-10-08 DIAGNOSIS — I10 ESSENTIAL HYPERTENSION: ICD-10-CM

## 2018-10-08 DIAGNOSIS — E78.1 HIGH TRIGLYCERIDES: ICD-10-CM

## 2018-10-08 PROCEDURE — 80061 LIPID PANEL: CPT

## 2018-10-08 PROCEDURE — 85025 COMPLETE CBC W/AUTO DIFF WBC: CPT | Performed by: RADIOLOGY

## 2018-10-08 PROCEDURE — 84443 ASSAY THYROID STIM HORMONE: CPT

## 2018-10-08 PROCEDURE — 80053 COMPREHEN METABOLIC PANEL: CPT | Performed by: RADIOLOGY

## 2018-10-08 PROCEDURE — 83721 ASSAY OF BLOOD LIPOPROTEIN: CPT

## 2018-10-08 PROCEDURE — 36415 COLL VENOUS BLD VENIPUNCTURE: CPT | Performed by: RADIOLOGY

## 2018-10-08 PROCEDURE — 82746 ASSAY OF FOLIC ACID SERUM: CPT

## 2018-10-08 PROCEDURE — 82607 VITAMIN B-12: CPT

## 2018-10-08 PROCEDURE — 83690 ASSAY OF LIPASE: CPT | Performed by: RADIOLOGY

## 2018-10-08 PROCEDURE — 83036 HEMOGLOBIN GLYCOSYLATED A1C: CPT | Performed by: RADIOLOGY

## 2018-10-09 ENCOUNTER — TELEPHONE (OUTPATIENT)
Dept: OTHER | Age: 58
End: 2018-10-09

## 2018-10-09 PROBLEM — D75.89 MACROCYTOSIS: Status: ACTIVE | Noted: 2018-10-09

## 2018-10-09 NOTE — TELEPHONE ENCOUNTER
Spoke with patient and made him aware of the results from below and of the plan of care. Patient voiced understanding and agreed with the plan of care and an appointment was scheduled for 10/11/18 with Dr. Ema Plunkett for follow-up.        Written by Crow Bethea

## 2018-10-11 ENCOUNTER — OFFICE VISIT (OUTPATIENT)
Dept: INTERNAL MEDICINE CLINIC | Facility: CLINIC | Age: 58
End: 2018-10-11
Payer: COMMERCIAL

## 2018-10-11 VITALS
WEIGHT: 177.19 LBS | DIASTOLIC BLOOD PRESSURE: 84 MMHG | HEIGHT: 71 IN | HEART RATE: 66 BPM | SYSTOLIC BLOOD PRESSURE: 142 MMHG | BODY MASS INDEX: 24.81 KG/M2 | TEMPERATURE: 98 F

## 2018-10-11 DIAGNOSIS — R74.01 TRANSAMINITIS: ICD-10-CM

## 2018-10-11 DIAGNOSIS — E78.1 HIGH TRIGLYCERIDES: Primary | ICD-10-CM

## 2018-10-11 DIAGNOSIS — I10 ESSENTIAL HYPERTENSION: ICD-10-CM

## 2018-10-11 DIAGNOSIS — D75.89 MACROCYTOSIS: ICD-10-CM

## 2018-10-11 PROCEDURE — 99214 OFFICE O/P EST MOD 30 MIN: CPT | Performed by: INTERNAL MEDICINE

## 2018-10-11 PROCEDURE — 99212 OFFICE O/P EST SF 10 MIN: CPT | Performed by: INTERNAL MEDICINE

## 2018-10-11 NOTE — ASSESSMENT & PLAN NOTE
Initial blood pressure elevated. Reports home blood pressure readings are better. Repeat manual blood pressure was much better, 142/80. He is on metoprolol succinate 50 mg daily. CPM.  Informed the patient to check blood pressure at home.   Contact me i

## 2018-10-11 NOTE — ASSESSMENT & PLAN NOTE
LFTs improved. Transaminitis due to excessive alcohol use. AST down to 82 from 110s. ALT normalized. Total bilirubin normalized. Advised the patient to completely avoid alcohol. He  has cut down to 1 or 2 drinks-wine daily or every other day.   Jose G Braulio

## 2018-10-11 NOTE — PROGRESS NOTES
Margaux Joseph is a 63year old male.   Patient presents with:  Abnormal Labs      HPI:     HPI  Patient is a 67-year-old male with alcohol dependence, squamous cell carcinoma of the tonsil status post resection and radiation completed in March 2018 here f History:   Procedure Laterality Date   • Rad resec tonsil/pillars  01/05/2018   • Removal of tonsils,12+ y/o  01/05/2018   • Tonsillectomy  01/05/2018   • New Era teeth removed        Social History:  Social History    Tobacco Use      Smoking status: Never Pulmonary/Chest: Effort normal and breath sounds normal.   Abdominal: Soft. Bowel sounds are normal. He exhibits no distension and no mass. There is no tenderness. There is no rebound and no guarding. Lymphadenopathy:     He has no cervical adenopathy. triglycerides. Therefore even if the triglycerides are improving his risk remains elevated. He understands that he would benefit from statins. Will consider in the next office visit.          Relevant Orders    LIPID PANEL    AST (SGOT)    ALT (SGPT)

## 2018-10-11 NOTE — PATIENT INSTRUCTIONS
Follow-up in 4 weeks for annual physical.  Please do the labs prior to the next office visit.       Avoid sugar sweetened beverages  Reduce the amount of potatoes, white bread , pastas  Substitute with low fat diary products, fruits, vegetables, whole grain

## 2018-10-11 NOTE — ASSESSMENT & PLAN NOTE
Hypertriglyceridemia-498. Normal lipase. Patient was counseled on diet and abstinence of alcohol. Patient wants to try lifestyle modification before starting medication. Repeat lipid panel in 4 weeks.   Informed the patient to watch low-carb and protein

## 2018-10-18 ENCOUNTER — TELEPHONE (OUTPATIENT)
Dept: INTERNAL MEDICINE CLINIC | Facility: CLINIC | Age: 58
End: 2018-10-18

## 2018-10-18 ENCOUNTER — TELEPHONE (OUTPATIENT)
Dept: RADIATION ONCOLOGY | Facility: HOSPITAL | Age: 58
End: 2018-10-18

## 2018-10-18 DIAGNOSIS — C09.9 CANCER OF TONSIL (HCC): Primary | ICD-10-CM

## 2018-10-18 NOTE — TELEPHONE ENCOUNTER
Called pt and informed to schedule CT scan neck prior to follow up visit with Dr Jaya Jones 11/13/18. Instructed pt that I will call him when the CT is approved.  Called insurance and spoke with Yamile asking for CT to be pre-approved urgently as pt will be goi

## 2018-10-31 ENCOUNTER — TELEPHONE (OUTPATIENT)
Dept: OTHER | Age: 58
End: 2018-10-31

## 2018-10-31 NOTE — TELEPHONE ENCOUNTER
Patient stated he is having surgery on 11/14/18 for hip replacement surgery. He will have the blood work done on 11/10/18 and the CT scan done that day. And a EKG. I transferred to the call center to schedule an appointment.   I informed the patient th

## 2018-11-09 ENCOUNTER — TELEPHONE (OUTPATIENT)
Dept: INTERNAL MEDICINE CLINIC | Facility: CLINIC | Age: 58
End: 2018-11-09

## 2018-11-09 DIAGNOSIS — Z01.818 PREOP EXAM FOR INTERNAL MEDICINE: Primary | ICD-10-CM

## 2018-11-09 NOTE — TELEPHONE ENCOUNTER
Pt states he has surgery schedule on the 13 pt states he has na order from his surgeon to get blood work done for   UA CNF, CBC, CMP,MRSA NASAL SPRAY, CRP, SED RATE, EKG     Pt would like to know if with these test results he will be cleared by Dr. Marvin Johansen

## 2018-11-09 NOTE — TELEPHONE ENCOUNTER
Orders for preop labs, ekg and cxr placed.  Please complete before appoinment date on 11/12/18 for preop physical. Please inform

## 2018-11-10 ENCOUNTER — APPOINTMENT (OUTPATIENT)
Dept: LAB | Facility: HOSPITAL | Age: 58
End: 2018-11-10
Attending: RADIOLOGY
Payer: COMMERCIAL

## 2018-11-10 ENCOUNTER — LAB ENCOUNTER (OUTPATIENT)
Dept: LAB | Facility: HOSPITAL | Age: 58
End: 2018-11-10
Attending: RADIOLOGY
Payer: COMMERCIAL

## 2018-11-10 ENCOUNTER — HOSPITAL ENCOUNTER (OUTPATIENT)
Dept: CT IMAGING | Facility: HOSPITAL | Age: 58
Discharge: HOME | End: 2018-11-10
Attending: RADIOLOGY
Payer: COMMERCIAL

## 2018-11-10 DIAGNOSIS — R26.9 ABNORMALITY OF GAIT: ICD-10-CM

## 2018-11-10 DIAGNOSIS — C09.9 CANCER OF TONSIL (HCC): ICD-10-CM

## 2018-11-10 DIAGNOSIS — I10 ESSENTIAL HYPERTENSION, MALIGNANT: Primary | ICD-10-CM

## 2018-11-10 DIAGNOSIS — M25.552 LEFT HIP PAIN: ICD-10-CM

## 2018-11-10 DIAGNOSIS — M87.9 ACUTE OSTEONECROSIS (HCC): ICD-10-CM

## 2018-11-10 DIAGNOSIS — M16.12 PRIMARY OSTEOARTHRITIS OF LEFT HIP: ICD-10-CM

## 2018-11-10 PROCEDURE — 36415 COLL VENOUS BLD VENIPUNCTURE: CPT

## 2018-11-10 PROCEDURE — 93005 ELECTROCARDIOGRAM TRACING: CPT

## 2018-11-10 PROCEDURE — 87641 MR-STAPH DNA AMP PROBE: CPT

## 2018-11-10 PROCEDURE — 80053 COMPREHEN METABOLIC PANEL: CPT

## 2018-11-10 PROCEDURE — 85652 RBC SED RATE AUTOMATED: CPT

## 2018-11-10 PROCEDURE — 86140 C-REACTIVE PROTEIN: CPT

## 2018-11-10 PROCEDURE — 82565 ASSAY OF CREATININE: CPT

## 2018-11-10 PROCEDURE — 93010 ELECTROCARDIOGRAM REPORT: CPT | Performed by: ORTHOPAEDIC SURGERY

## 2018-11-10 PROCEDURE — 85025 COMPLETE CBC W/AUTO DIFF WBC: CPT

## 2018-11-10 PROCEDURE — 87086 URINE CULTURE/COLONY COUNT: CPT

## 2018-11-10 PROCEDURE — 70491 CT SOFT TISSUE NECK W/DYE: CPT | Performed by: RADIOLOGY

## 2018-11-12 ENCOUNTER — OFFICE VISIT (OUTPATIENT)
Dept: OTOLARYNGOLOGY | Facility: CLINIC | Age: 58
End: 2018-11-12
Payer: COMMERCIAL

## 2018-11-12 ENCOUNTER — OFFICE VISIT (OUTPATIENT)
Dept: INTERNAL MEDICINE CLINIC | Facility: CLINIC | Age: 58
End: 2018-11-12
Payer: COMMERCIAL

## 2018-11-12 VITALS
SYSTOLIC BLOOD PRESSURE: 167 MMHG | TEMPERATURE: 99 F | WEIGHT: 183.5 LBS | HEART RATE: 61 BPM | HEIGHT: 71 IN | BODY MASS INDEX: 25.69 KG/M2 | DIASTOLIC BLOOD PRESSURE: 99 MMHG

## 2018-11-12 VITALS
BODY MASS INDEX: 25.76 KG/M2 | DIASTOLIC BLOOD PRESSURE: 103 MMHG | TEMPERATURE: 99 F | WEIGHT: 184 LBS | HEIGHT: 71 IN | SYSTOLIC BLOOD PRESSURE: 149 MMHG

## 2018-11-12 DIAGNOSIS — D75.89 MACROCYTOSIS: ICD-10-CM

## 2018-11-12 DIAGNOSIS — R94.31 ABNORMAL EKG: ICD-10-CM

## 2018-11-12 DIAGNOSIS — E78.1 HIGH TRIGLYCERIDES: ICD-10-CM

## 2018-11-12 DIAGNOSIS — I10 ESSENTIAL HYPERTENSION: ICD-10-CM

## 2018-11-12 DIAGNOSIS — M87.9 OSTEONECROSIS OF LEFT HIP (HCC): ICD-10-CM

## 2018-11-12 DIAGNOSIS — Z01.818 PREOP EXAM FOR INTERNAL MEDICINE: Primary | ICD-10-CM

## 2018-11-12 DIAGNOSIS — C09.9 PRIMARY TONSILLAR SQUAMOUS CELL CARCINOMA (HCC): Primary | ICD-10-CM

## 2018-11-12 DIAGNOSIS — E78.1 HIGH TRIGLYCERIDES: Primary | ICD-10-CM

## 2018-11-12 PROCEDURE — 99212 OFFICE O/P EST SF 10 MIN: CPT | Performed by: INTERNAL MEDICINE

## 2018-11-12 PROCEDURE — 31575 DIAGNOSTIC LARYNGOSCOPY: CPT | Performed by: OTOLARYNGOLOGY

## 2018-11-12 PROCEDURE — 99212 OFFICE O/P EST SF 10 MIN: CPT | Performed by: OTOLARYNGOLOGY

## 2018-11-12 PROCEDURE — 99214 OFFICE O/P EST MOD 30 MIN: CPT | Performed by: OTOLARYNGOLOGY

## 2018-11-12 PROCEDURE — 99244 OFF/OP CNSLTJ NEW/EST MOD 40: CPT | Performed by: INTERNAL MEDICINE

## 2018-11-12 RX ORDER — FOLIC ACID 1 MG/1
1 TABLET ORAL DAILY
Qty: 90 TABLET | Refills: 1 | Status: SHIPPED | OUTPATIENT
Start: 2018-11-12 | End: 2019-08-21

## 2018-11-12 RX ORDER — MELATONIN
100 DAILY
Qty: 90 TABLET | Refills: 1 | Status: SHIPPED | OUTPATIENT
Start: 2018-11-12 | End: 2019-12-10

## 2018-11-12 RX ORDER — AMLODIPINE BESYLATE 5 MG/1
5 TABLET ORAL DAILY
Qty: 30 TABLET | Refills: 3 | Status: SHIPPED | OUTPATIENT
Start: 2018-11-12 | End: 2019-05-04

## 2018-11-12 NOTE — ASSESSMENT & PLAN NOTE
Normal Folic acid and vitamin B12  Macrocytosis due to alcohol use. Starting folic acid 1 mg and thiamine 100 mg daily.   Prescription sent to pharmacy

## 2018-11-12 NOTE — ASSESSMENT & PLAN NOTE
BP uncontrolled. On metoprolol Succinate 50 mg daily. Adding amlodipine 5 mg. Goal less than 130/80. Patient to check blood pressure at home. If remains elevated more than 150/90, please contact me. Counseled on low-salt diet and exercise.

## 2018-11-12 NOTE — PROGRESS NOTES
Shi Styles is a 63year old male. Patient presents with: Tonsillitis: 6 month f/u T2NO scca of right tonsil: ct scan done on 11/10/18      HISTORY OF PRESENT ILLNESS  11/29/2017  Patient prevents for evaluation of a suspected right throat mass.   Aft followup visit. 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 He saw Dr. Polly Freeman who obtained a CT scan of the neck on 12/05/2017.   This demonstrated asymmetric enlargement of the right Not on file      Family History   Problem Relation Age of Onset   • Cancer Mother         uterine       Past Medical History:   Diagnosis Date   • Cancer ([facility] [address]     squamous cell right tonsil   • Decorative tattoo    • Essential hypertension    • High bloo - Normal.             Ears Normal Inspection - Right: Normal, Left: Normal. Canal - Right: Normal, Left: Normal. TM - Right: Normal, Left: Normal.   Skin Normal Inspection - Normal.        Lymph Detail Normal Submental. Submandibular.  Anterior cervical. Po of right tonsil     No evidence of disease on physical exam and also I reviewed his CAT scan at length and he does not have any suspicious findings on that.   I discussed the fact that routine exposure to alcohol will increase his risk of recurrence I  Alisha

## 2018-11-12 NOTE — ASSESSMENT & PLAN NOTE
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 No change from January 2018. Patient refuses to do any further testing prior to surgery as this finding is old.   He understands the risks and accepts  Denies any anginal symptoms  Patient can proceed with surgery wit

## 2018-11-12 NOTE — PATIENT INSTRUCTIONS
Blood pressure medicine-amlodipine 5 mg once a day. Goal  blood pressure reading is less than 130/80. Please monitor your blood pressure at home. If the blood pressure remains elevated more than 150/90 after starting medication please contact me.   Johanny

## 2018-11-12 NOTE — ASSESSMENT & PLAN NOTE
Hypertriglyceridemia-498 almost 1 month ago. .  Lipid panel reflex added to the labs collected 2 days ago. Normal lipase. Patient was counseled on diet and abstinence of alcohol. Patient wants to try lifestyle modification before starting medication.

## 2018-11-12 NOTE — PROGRESS NOTES
Parveen Juares is a 63year old male.   Patient presents with:  Pre-Op Exam: pt is having surgery on left hip 11/14/18      HPI:     HPI  Patient is a 80-year-old male with alcohol dependence, squamous cell carcinoma of the tonsil status post resection and status: Former Smoker        Types: Cigars      Smokeless tobacco: Former User    Alcohol use: Yes      Comment: 6-12 drinks/vodka/day    Drug use: No       REVIEW OF SYSTEMS:   Review of Systems   Constitutional: Negative for chills, fever, malaise/fatigu Lymphadenopathy:     He has no cervical adenopathy. Neurological: He is alert and oriented to person, place, and time. He has normal reflexes. Skin: Skin is warm and dry.          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 No change from January 2018. Patient refuses to do any further testing prior to surgery as this finding is old.   He understands the risks and

## 2018-11-12 NOTE — ASSESSMENT & PLAN NOTE
Reviewed preop labs and EKG. Elevated CRP 1.5, of unknown significance, no infectious focus identified. Normal ESR. LFTs close to his baseline. Macrocytosis noted again. EKG showing old inferior infarct.   Informed the patient that he had the same find

## 2018-11-13 ENCOUNTER — OFFICE VISIT (OUTPATIENT)
Dept: RADIATION ONCOLOGY | Facility: HOSPITAL | Age: 58
End: 2018-11-13
Attending: RADIOLOGY
Payer: COMMERCIAL

## 2018-11-13 ENCOUNTER — TELEPHONE (OUTPATIENT)
Dept: OTHER | Age: 58
End: 2018-11-13

## 2018-11-13 VITALS
RESPIRATION RATE: 16 BRPM | TEMPERATURE: 98 F | DIASTOLIC BLOOD PRESSURE: 92 MMHG | HEART RATE: 70 BPM | HEIGHT: 71 IN | SYSTOLIC BLOOD PRESSURE: 141 MMHG | BODY MASS INDEX: 25.53 KG/M2 | WEIGHT: 182.38 LBS

## 2018-11-13 DIAGNOSIS — C09.9 RIGHT TONSILLAR SQUAMOUS CELL CARCINOMA (HCC): Primary | ICD-10-CM

## 2018-11-13 DIAGNOSIS — I10 ESSENTIAL HYPERTENSION: Primary | ICD-10-CM

## 2018-11-13 DIAGNOSIS — E78.1 HIGH TRIGLYCERIDES: ICD-10-CM

## 2018-11-13 PROCEDURE — 99211 OFF/OP EST MAY X REQ PHY/QHP: CPT

## 2018-11-13 NOTE — TELEPHONE ENCOUNTER
He would like to know what happened? He would like a call from Dr. Hannah Hess? Patient stated he received a call from his surgeon today who stated his surgery was cancelled.     He stated he was cleared for surgery yesterday and now he was told it was canc

## 2018-11-13 NOTE — PATIENT INSTRUCTIONS
Dr Santiago Rossi follow up 2/21 @ 1000am. Please call Miquel Miller with any schedule changes @ [phone].

## 2018-11-13 NOTE — PROGRESS NOTES
[facility]    PATIENT'S NAME: Shi ANAND [facility]   RADIATION ONCOLOGIST: Paola Jurado.  Ligia Burrows MD   PATIENT ACCOUNT #: [de-identified] LOCATION: [address] [place] [address] RECORD #: [id number] YOB: 1960   FOLLOW-UP DATE: 11/13/2018       RADIAT doing much better at the current time. His health has improved as a result. He states that he is feeling good and his energy level is improved. His appetite is normal and he is gaining weight.   He does report some persistent xerostomia in the morning bu should be questions regarding the radiotherapy, please feel free to contact me at any time. Dictated By Jigar Rivera MD  d: 11/13/2018 10:55:40  t: 11/13/2018 11:49:21  [facility] [id number]  NAD/    cc: MD Meri Carlin

## 2018-11-13 NOTE — PROGRESS NOTES
Pt seen in 3 month follow up with Dr Carolene Peabody, having completed H&N radiation 3/19/18. Saw Dr Carlo Fuentes yesterday, and had a scope done and discussed results of recent CT scan done 11/10. Pt feeling much better overall. Keeping all of his Dr appointments.

## 2018-11-14 NOTE — TELEPHONE ENCOUNTER
Contacted the patient. Patient refuses to undergo any further cardiac testing at this point. He accepts the risk involved and want to proceed with the surgery. Contacted Loli Romeo.   Patient scheduled for left hip replacement tomorrow as initially p

## 2018-11-23 ENCOUNTER — HOSPITAL ENCOUNTER (OUTPATIENT)
Dept: NUCLEAR MEDICINE | Facility: HOSPITAL | Age: 58
Discharge: HOME | End: 2018-11-23
Attending: INTERNAL MEDICINE
Payer: COMMERCIAL

## 2018-11-23 ENCOUNTER — HOSPITAL ENCOUNTER (OUTPATIENT)
Dept: CV DIAGNOSTICS | Facility: HOSPITAL | Age: 58
Discharge: HOME | End: 2018-11-23
Attending: INTERNAL MEDICINE
Payer: COMMERCIAL

## 2018-11-23 DIAGNOSIS — R94.31 ABNORMAL EKG: ICD-10-CM

## 2018-11-23 DIAGNOSIS — Z01.818 PREOP EXAM FOR INTERNAL MEDICINE: ICD-10-CM

## 2018-11-23 PROCEDURE — 93018 CV STRESS TEST I&R ONLY: CPT | Performed by: INTERNAL MEDICINE

## 2018-11-23 PROCEDURE — 93017 CV STRESS TEST TRACING ONLY: CPT | Performed by: INTERNAL MEDICINE

## 2018-11-23 PROCEDURE — 78452 HT MUSCLE IMAGE SPECT MULT: CPT | Performed by: INTERNAL MEDICINE

## 2018-11-23 PROCEDURE — 93016 CV STRESS TEST SUPVJ ONLY: CPT | Performed by: INTERNAL MEDICINE

## 2019-02-21 ENCOUNTER — OFFICE VISIT (OUTPATIENT)
Dept: RADIATION ONCOLOGY | Facility: HOSPITAL | Age: 59
End: 2019-02-21
Attending: RADIOLOGY
Payer: COMMERCIAL

## 2019-02-21 VITALS
BODY MASS INDEX: 26.15 KG/M2 | TEMPERATURE: 98 F | WEIGHT: 186.81 LBS | DIASTOLIC BLOOD PRESSURE: 90 MMHG | SYSTOLIC BLOOD PRESSURE: 141 MMHG | HEART RATE: 87 BPM | RESPIRATION RATE: 18 BRPM | HEIGHT: 71 IN

## 2019-02-21 DIAGNOSIS — C09.9 RIGHT TONSILLAR SQUAMOUS CELL CARCINOMA (HCC): Primary | ICD-10-CM

## 2019-02-21 PROCEDURE — 99211 OFF/OP EST MAY X REQ PHY/QHP: CPT

## 2019-02-21 NOTE — PATIENT INSTRUCTIONS
CT scan in May with a follow up with Dr Adalberto Wang following. Follow up with Dr Yue Kline in August. Call Fred Zepeda for appt in June or July @ [phone].

## 2019-02-21 NOTE — PROGRESS NOTES
Pt seen in 3 month follow up with Dr Ana Burgess, having completed radiation to the R tonsil 3/19/18. Last CT neck was November, 2018. To see Dr Candace Judd in April, encouraged to make that appointment.  C/o \"mild\" xerostomia, states \"taste buds are not 100%\"

## 2019-02-22 NOTE — PROGRESS NOTES
[facility]    PATIENT'S NAME: CHENG, Shi [facility]   RADIATION ONCOLOGIST: Brenda Quiles.  Chikis Richardson MD   PATIENT ACCOUNT #: [de-identified] LOCATION: [address] [place] [address] RECORD #: [id number] YOB: 1960   FOLLOW-UP DATE: 02/21/2019       RADIAT of its pretreatment levels, though it does not seem to be improving any longer. He rates the overall inconvenience of his symptoms at the current time as merely a 1/10. He otherwise appears to be doing well and is in good spirits today.   He does have a p last note, he should be seeing her sometime around May. He is due for a CT scan around that time, so I have ordered for a CT scan of the neck to be done in May, and I told him to follow up with Dr. Joseph Nunez shortly afterward.   I would see him again for foll

## 2019-05-04 RX ORDER — AMLODIPINE BESYLATE 5 MG/1
TABLET ORAL
Qty: 90 TABLET | Refills: 1 | Status: SHIPPED | OUTPATIENT
Start: 2019-05-04 | End: 2019-12-30

## 2019-05-04 RX ORDER — METOPROLOL SUCCINATE 50 MG/1
TABLET, EXTENDED RELEASE ORAL
Qty: 90 TABLET | Refills: 1 | Status: SHIPPED | OUTPATIENT
Start: 2019-05-04 | End: 2020-05-20

## 2019-08-22 RX ORDER — FOLIC ACID 1 MG/1
TABLET ORAL
Qty: 90 TABLET | Refills: 1 | Status: SHIPPED | OUTPATIENT
Start: 2019-08-22 | End: 2019-12-30

## 2019-08-22 NOTE — TELEPHONE ENCOUNTER
CSS please assist patient in establishing care with new PCP      Review pended refill request as it does not fall under a protocol.     Requested Prescriptions     Pending Prescriptions Disp Refills   • FOLIC ACID 1 MG Oral Tab [Pharmacy Med Name: Babak Escobar

## 2019-10-07 ENCOUNTER — TELEPHONE (OUTPATIENT)
Dept: RADIATION ONCOLOGY | Facility: HOSPITAL | Age: 59
End: 2019-10-07

## 2019-10-07 NOTE — TELEPHONE ENCOUNTER
Called pt in regards to his order for CT scan, which was due in May per Dr Michelle Sanders. Pt states \"I have been putting it off. \" Encouraged to please schedule CT neck, and once he knows the date of the CT to please schedule an appointment with Dr Iris Barnhart for

## 2019-11-21 ENCOUNTER — HOSPITAL ENCOUNTER (OUTPATIENT)
Dept: CT IMAGING | Age: 59
Discharge: HOME | End: 2019-11-21
Attending: RADIOLOGY
Payer: COMMERCIAL

## 2019-11-21 DIAGNOSIS — C09.9 RIGHT TONSILLAR SQUAMOUS CELL CARCINOMA (HCC): ICD-10-CM

## 2019-11-21 PROCEDURE — 82565 ASSAY OF CREATININE: CPT

## 2019-11-21 PROCEDURE — 70491 CT SOFT TISSUE NECK W/DYE: CPT | Performed by: RADIOLOGY

## 2019-11-27 ENCOUNTER — OFFICE VISIT (OUTPATIENT)
Dept: INTERNAL MEDICINE CLINIC | Facility: CLINIC | Age: 59
End: 2019-11-27
Payer: COMMERCIAL

## 2019-11-27 ENCOUNTER — OFFICE VISIT (OUTPATIENT)
Dept: OTOLARYNGOLOGY | Facility: CLINIC | Age: 59
End: 2019-11-27
Payer: COMMERCIAL

## 2019-11-27 VITALS
HEIGHT: 71 IN | OXYGEN SATURATION: 93 % | TEMPERATURE: 98 F | BODY MASS INDEX: 26.88 KG/M2 | SYSTOLIC BLOOD PRESSURE: 121 MMHG | DIASTOLIC BLOOD PRESSURE: 76 MMHG | WEIGHT: 192 LBS | HEART RATE: 73 BPM

## 2019-11-27 VITALS
SYSTOLIC BLOOD PRESSURE: 134 MMHG | WEIGHT: 192 LBS | DIASTOLIC BLOOD PRESSURE: 86 MMHG | BODY MASS INDEX: 26.88 KG/M2 | HEIGHT: 71 IN | TEMPERATURE: 98 F

## 2019-11-27 DIAGNOSIS — C09.9 TONSILLAR CANCER (HCC): ICD-10-CM

## 2019-11-27 DIAGNOSIS — Z12.11 SCREEN FOR COLON CANCER: ICD-10-CM

## 2019-11-27 DIAGNOSIS — Z12.5 SCREENING PSA (PROSTATE SPECIFIC ANTIGEN): ICD-10-CM

## 2019-11-27 DIAGNOSIS — I10 ESSENTIAL HYPERTENSION: Primary | ICD-10-CM

## 2019-11-27 DIAGNOSIS — Z23 NEED FOR INFLUENZA VACCINATION: ICD-10-CM

## 2019-11-27 DIAGNOSIS — C09.9 CARCINOMA OF TONSIL (HCC): Primary | ICD-10-CM

## 2019-11-27 DIAGNOSIS — K70.0 ALCOHOLIC FATTY LIVER: ICD-10-CM

## 2019-11-27 PROCEDURE — 99214 OFFICE O/P EST MOD 30 MIN: CPT | Performed by: INTERNAL MEDICINE

## 2019-11-27 PROCEDURE — 99214 OFFICE O/P EST MOD 30 MIN: CPT | Performed by: OTOLARYNGOLOGY

## 2019-11-27 PROCEDURE — 31575 DIAGNOSTIC LARYNGOSCOPY: CPT | Performed by: OTOLARYNGOLOGY

## 2019-11-27 RX ORDER — HYDROXYZINE 50 MG/1
1 TABLET, FILM COATED ORAL DAILY
Refills: 0 | COMMUNITY
Start: 2019-11-07 | End: 2019-12-10

## 2019-11-27 RX ORDER — MIRTAZAPINE 15 MG/1
1 TABLET, FILM COATED ORAL DAILY
Refills: 0 | COMMUNITY
Start: 2019-11-07 | End: 2019-12-10

## 2019-11-27 RX ORDER — TRAZODONE HYDROCHLORIDE 50 MG/1
1 TABLET ORAL DAILY
Refills: 0 | COMMUNITY
Start: 2019-11-05 | End: 2019-12-10

## 2019-11-27 NOTE — PROGRESS NOTES
Davey Lozano is a 63year old male. Patient presents with:  Results: ct scan done on 11/21/19      HISTORY OF PRESENT ILLNESS  11/29/2017  Patient prevents for evaluation of a suspected right throat mass.   After his girlfriends daughter was diagnose squamous cell carcinoma of the right tonsil. This was noted to be T2N0 and was p16 positive. He saw Dr. Tita Fishman who obtained a CT scan of the neck on 12/05/2017.   This demonstrated asymmetric enlargement of the right palatine tonsil with no peritonsillar Problem Relation Age of Onset   • Cancer Mother         uterine       Past Medical History:   Diagnosis Date   • Arthritis    • Cancer ([address]     squamous cell right tonsil   • Decorative tattoo    • Essential hypertension    • High blood pressure Left: Normal. Canal - Right: Normal, Left: Normal. TM - Right: Normal, Left: Normal.   Skin Normal Inspection - Normal.        Lymph Detail Normal Submental. Submandibular. Anterior cervical. Posterior cervical. Supraclavicular.         Nose/Mouth/Throat ab 1  •  AMLODIPINE BESYLATE 5 MG Oral Tab, TAKE 1 TABLET(5 MG) BY MOUTH DAILY, Disp: 90 tablet, Rfl: 1  •  Thiamine HCl 100 MG Oral Tab, Take 1 tablet (100 mg total) by mouth daily. , Disp: 90 tablet, Rfl: 1  ASSESSMENT AND PLAN    1.  T2N0 scca of right tonsi

## 2019-11-27 NOTE — PROGRESS NOTES
Mark Anthony Brumfield is a 63year old male. Patient presents with:  Establish Care  Tingling: R-arm  Cough: dry cough, slight wheezing, onset about 2 weeks    HPI:   72-year-old gentleman with a past medical history of alcohol abuse now in remission, hypertrosalie tonsil   • Decorative tattoo    • Essential hypertension    • High blood pressure       Past Surgical History:   Procedure Laterality Date   • HIP REPLACEMENT SURGERY Left 11/2018   • RAD RESEC TONSIL/PILLARS  01/05/2018   • REMOVAL OF TONSILS,13+ Y/O  01/ Cardiovascular: Normal rate and regular rhythm. No murmur heard. Pulmonary/Chest: Effort normal and breath sounds normal. No respiratory distress. He has no wheezes. He has no rales. Abdominal: Soft.  Bowel sounds are normal.   Neurological: He is a

## 2019-12-06 ENCOUNTER — APPOINTMENT (OUTPATIENT)
Dept: LAB | Facility: HOSPITAL | Age: 59
End: 2019-12-06
Attending: INTERNAL MEDICINE
Payer: COMMERCIAL

## 2019-12-06 ENCOUNTER — IMMUNIZATION (OUTPATIENT)
Dept: INTERNAL MEDICINE CLINIC | Facility: CLINIC | Age: 59
End: 2019-12-06
Payer: COMMERCIAL

## 2019-12-06 DIAGNOSIS — Z12.5 SCREENING PSA (PROSTATE SPECIFIC ANTIGEN): ICD-10-CM

## 2019-12-06 DIAGNOSIS — I10 ESSENTIAL HYPERTENSION: ICD-10-CM

## 2019-12-06 DIAGNOSIS — K70.0 ALCOHOLIC FATTY LIVER: ICD-10-CM

## 2019-12-06 DIAGNOSIS — Z23 NEED FOR VACCINATION: ICD-10-CM

## 2019-12-06 PROCEDURE — 80053 COMPREHEN METABOLIC PANEL: CPT

## 2019-12-06 PROCEDURE — 36415 COLL VENOUS BLD VENIPUNCTURE: CPT

## 2019-12-06 PROCEDURE — 84443 ASSAY THYROID STIM HORMONE: CPT

## 2019-12-06 PROCEDURE — 80061 LIPID PANEL: CPT

## 2019-12-06 PROCEDURE — 82607 VITAMIN B-12: CPT

## 2019-12-06 PROCEDURE — 83036 HEMOGLOBIN GLYCOSYLATED A1C: CPT

## 2019-12-06 PROCEDURE — 90686 IIV4 VACC NO PRSV 0.5 ML IM: CPT | Performed by: INTERNAL MEDICINE

## 2019-12-06 PROCEDURE — 85027 COMPLETE CBC AUTOMATED: CPT

## 2019-12-06 PROCEDURE — 90471 IMMUNIZATION ADMIN: CPT | Performed by: INTERNAL MEDICINE

## 2019-12-09 DIAGNOSIS — E11.9 TYPE 2 DIABETES MELLITUS WITHOUT COMPLICATION, WITHOUT LONG-TERM CURRENT USE OF INSULIN (HCC): Primary | ICD-10-CM

## 2019-12-10 PROBLEM — D69.6 THROMBOCYTOPENIA (HCC): Status: ACTIVE | Noted: 2019-12-10

## 2019-12-10 PROBLEM — D69.6 THROMBOCYTOPENIA: Status: ACTIVE | Noted: 2019-12-10

## 2019-12-30 ENCOUNTER — TELEPHONE (OUTPATIENT)
Dept: INTERNAL MEDICINE CLINIC | Facility: CLINIC | Age: 59
End: 2019-12-30

## 2019-12-30 PROBLEM — E11.9 TYPE 2 DIABETES MELLITUS WITHOUT COMPLICATION, WITHOUT LONG-TERM CURRENT USE OF INSULIN (HCC): Status: ACTIVE | Noted: 2019-12-30

## 2019-12-30 NOTE — PATIENT INSTRUCTIONS
ASSESSMENT AND PLAN:   1. Alcoholic fatty liver   -He continues to abstain from drinking. He is ultrasound of the liver showed early cirrhosis morphology. He does have positive anti-mitochondrial antibody. He also has a gallbladder polyp.   I encouraged

## 2019-12-30 NOTE — PROGRESS NOTES
Nuha Burnett is a 63year old male. Patient presents with: Follow - Up: results    HPI:   61-year-old gentleman with past medical history of alcohol abuse now on remission, hypertension here for follow-up.   His blood test showed that his hemoglobin A Problem Relation Age of Onset   • Cancer Mother         uterine      No Known Allergies     REVIEW OF SYSTEMS:     Review of Systems   Constitutional: Negative for activity change, appetite change and fever.    HENT: Negative for congestion, drooling and encouraged him to follow-up with gastroenterologist and discuss with him regarding this. 2. Essential hypertension  He stopped amlodipine 4 days back. We will stop amlodipine forever as his blood pressure is still optimally controlled.   I will check hi

## 2019-12-30 NOTE — TELEPHONE ENCOUNTER
The order for  his diabetic supplies the pharmacy will not take it need to be in a prescription paper please fax one or send electronic to his lloyd

## 2020-01-03 RX ORDER — LANCETS 30 GAUGE
EACH MISCELLANEOUS
Qty: 100 EACH | Refills: 3 | Status: SHIPPED | OUTPATIENT
Start: 2020-01-03 | End: 2021-12-09

## 2020-01-03 RX ORDER — ISOPROPYL ALCOHOL 0.7 ML/1
SWAB TOPICAL
Qty: 100 EACH | Refills: 3 | Status: SHIPPED | OUTPATIENT
Start: 2020-01-03 | End: 2021-12-09

## 2020-01-03 RX ORDER — BLOOD-GLUCOSE METER
1 EACH MISCELLANEOUS
Qty: 1 KIT | Refills: 0 | Status: SHIPPED | OUTPATIENT
Start: 2020-01-03 | End: 2021-12-09

## 2020-01-03 NOTE — TELEPHONE ENCOUNTER
Patient states he needs the diabetic testing supplies and the pharmacy requires a prescription be sent in. Orders pending please review and sign.

## 2020-01-10 ENCOUNTER — APPOINTMENT (OUTPATIENT)
Dept: ENDOCRINOLOGY | Facility: HOSPITAL | Age: 60
End: 2020-01-10
Attending: INTERNAL MEDICINE
Payer: COMMERCIAL

## 2020-01-24 ENCOUNTER — OFFICE VISIT (OUTPATIENT)
Dept: OTOLARYNGOLOGY | Facility: CLINIC | Age: 60
End: 2020-01-24
Payer: COMMERCIAL

## 2020-01-24 VITALS
DIASTOLIC BLOOD PRESSURE: 72 MMHG | TEMPERATURE: 97 F | SYSTOLIC BLOOD PRESSURE: 106 MMHG | WEIGHT: 190 LBS | HEIGHT: 71 IN | BODY MASS INDEX: 26.6 KG/M2

## 2020-01-24 DIAGNOSIS — K14.8 TONGUE BITING: Primary | ICD-10-CM

## 2020-01-24 PROCEDURE — 99213 OFFICE O/P EST LOW 20 MIN: CPT | Performed by: OTOLARYNGOLOGY

## 2020-01-24 RX ORDER — AMOXICILLIN AND CLAVULANATE POTASSIUM 875; 125 MG/1; MG/1
1 TABLET, FILM COATED ORAL 2 TIMES DAILY
Qty: 28 TABLET | Refills: 0 | Status: SHIPPED | OUTPATIENT
Start: 2020-01-24 | End: 2020-02-07

## 2020-01-24 NOTE — PROGRESS NOTES
Nataile Warren is a 63year old male. Patient presents with:  Mouth/Lip Problem: bump/lesion on his tongue for 4 weeks      HISTORY OF PRESENT ILLNESS  11/29/2017  Patient prevents for evaluation of a suspected right throat mass.   After his girlfriend 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 He saw Dr. Tita Fishman who obtained a CT scan of the neck on 12/05/2017.   This demonstrated asymmetric enlargement of the right palatine tonsil tobacco: Former User    Substance and Sexual Activity      Alcohol use: Not Currently      Drug use: No      Sexual activity: Not Currently        Partners: Female      Family History   Problem Relation Age of Onset   • Cancer Mother         uterine Left: Normal.    Neurological Normal Memory - Normal. Cranial nerves - Cranial nerves II through XII grossly intact.    Head/Face Normal Facial features - Normal. Eyebrows - Normal. Skull - Normal.             Ears Normal Inspection - Right: Normal, Left: N slight irritation on bilateral lateral tongue surfaces along  where the teeth line is in the slightly reddish area on the right I think this might just be irritation and infection versus malignancy so I recommended 2 weeks of antibiotics and then I will fo

## 2020-04-27 ENCOUNTER — TELEPHONE (OUTPATIENT)
Dept: INTERNAL MEDICINE CLINIC | Facility: CLINIC | Age: 60
End: 2020-04-27

## 2020-04-27 DIAGNOSIS — E11.9 TYPE 2 DIABETES MELLITUS WITHOUT COMPLICATION, WITHOUT LONG-TERM CURRENT USE OF INSULIN (HCC): Primary | ICD-10-CM

## 2020-04-27 NOTE — TELEPHONE ENCOUNTER
Pt has been informed that he is due for DM follow-up, A1c. appt has been scheduled. Pt has appt with eye Md for June. Pt would like order for A1c so that he can get it done prior to appt.

## 2020-05-11 ENCOUNTER — APPOINTMENT (OUTPATIENT)
Dept: LAB | Age: 60
End: 2020-05-11
Attending: INTERNAL MEDICINE
Payer: COMMERCIAL

## 2020-05-11 DIAGNOSIS — E11.9 TYPE 2 DIABETES MELLITUS WITHOUT COMPLICATION, WITHOUT LONG-TERM CURRENT USE OF INSULIN (HCC): ICD-10-CM

## 2020-05-11 PROCEDURE — 80061 LIPID PANEL: CPT

## 2020-05-11 PROCEDURE — 82043 UR ALBUMIN QUANTITATIVE: CPT

## 2020-05-11 PROCEDURE — 82570 ASSAY OF URINE CREATININE: CPT

## 2020-05-11 PROCEDURE — 83036 HEMOGLOBIN GLYCOSYLATED A1C: CPT

## 2020-05-11 PROCEDURE — 80053 COMPREHEN METABOLIC PANEL: CPT

## 2020-05-11 PROCEDURE — 36415 COLL VENOUS BLD VENIPUNCTURE: CPT

## 2020-05-20 ENCOUNTER — OFFICE VISIT (OUTPATIENT)
Dept: INTERNAL MEDICINE CLINIC | Facility: CLINIC | Age: 60
End: 2020-05-20
Payer: COMMERCIAL

## 2020-05-20 VITALS
BODY MASS INDEX: 25.78 KG/M2 | DIASTOLIC BLOOD PRESSURE: 97 MMHG | WEIGHT: 184.13 LBS | OXYGEN SATURATION: 97 % | RESPIRATION RATE: 18 BRPM | TEMPERATURE: 98 F | HEIGHT: 71 IN | SYSTOLIC BLOOD PRESSURE: 169 MMHG | HEART RATE: 79 BPM

## 2020-05-20 DIAGNOSIS — E11.9 TYPE 2 DIABETES MELLITUS WITHOUT COMPLICATION, WITHOUT LONG-TERM CURRENT USE OF INSULIN (HCC): ICD-10-CM

## 2020-05-20 DIAGNOSIS — I10 ESSENTIAL HYPERTENSION: Primary | ICD-10-CM

## 2020-05-20 PROBLEM — D69.6 THROMBOCYTOPENIA (HCC): Status: RESOLVED | Noted: 2019-12-10 | Resolved: 2020-05-20

## 2020-05-20 PROBLEM — R73.01 ELEVATED FASTING BLOOD SUGAR: Status: RESOLVED | Noted: 2018-10-08 | Resolved: 2020-05-20

## 2020-05-20 PROBLEM — D69.6 THROMBOCYTOPENIA: Status: RESOLVED | Noted: 2019-12-10 | Resolved: 2020-05-20

## 2020-05-20 PROCEDURE — 3080F DIAST BP >= 90 MM HG: CPT | Performed by: INTERNAL MEDICINE

## 2020-05-20 PROCEDURE — 3008F BODY MASS INDEX DOCD: CPT | Performed by: INTERNAL MEDICINE

## 2020-05-20 PROCEDURE — 3077F SYST BP >= 140 MM HG: CPT | Performed by: INTERNAL MEDICINE

## 2020-05-20 PROCEDURE — 99213 OFFICE O/P EST LOW 20 MIN: CPT | Performed by: INTERNAL MEDICINE

## 2020-05-20 RX ORDER — METOPROLOL SUCCINATE 50 MG/1
50 TABLET, EXTENDED RELEASE ORAL DAILY
Qty: 90 TABLET | Refills: 1 | Status: SHIPPED | OUTPATIENT
Start: 2020-05-20

## 2020-05-20 NOTE — PROGRESS NOTES
Briscoe Bernheim is a 63year old male. Patient presents with: Follow - Up: Present for 3 month follow up. Took BS yesterday morning, was 117. Test Results: To discuss recent labs.      HPI:   20-year-old gentleman with a past medical history of alcohol Tobacco Use      Smoking status: Former Smoker        Types: Cigars      Smokeless tobacco: Former User    Alcohol use: Not Currently    Drug use: No     Family History   Problem Relation Age of Onset   • Cancer Mother         uterine      No Known Allergi check.    2. Type 2 diabetes mellitus without complication, without long-term current use of insulin (HCC)  Excellent. Diet controlled. Ophthalmology visit got postponed because of COVID-19.     Plan: I will see her back in 2 weeks for a blood pressure ch

## 2020-06-23 ENCOUNTER — OFFICE VISIT (OUTPATIENT)
Dept: OPHTHALMOLOGY | Facility: CLINIC | Age: 60
End: 2020-06-23
Payer: COMMERCIAL

## 2020-06-23 DIAGNOSIS — H25.13 AGE-RELATED NUCLEAR CATARACT OF BOTH EYES: ICD-10-CM

## 2020-06-23 DIAGNOSIS — H43.393 FLOATER, VITREOUS, BILATERAL: ICD-10-CM

## 2020-06-23 DIAGNOSIS — E11.9 DIET-CONTROLLED DIABETES MELLITUS (HCC): Primary | ICD-10-CM

## 2020-06-23 DIAGNOSIS — Z98.890 HX OF LASIK: ICD-10-CM

## 2020-06-23 PROCEDURE — 99243 OFF/OP CNSLTJ NEW/EST LOW 30: CPT | Performed by: OPHTHALMOLOGY

## 2020-06-23 PROCEDURE — 92015 DETERMINE REFRACTIVE STATE: CPT | Performed by: OPHTHALMOLOGY

## 2020-06-23 NOTE — PROGRESS NOTES
Scott Taylor is a 63year old male. HPI:     HPI     Consult      Additional comments: Consult per Dr. Terrence Kumar              Comments     NP.  Patient is here for a complete eye exam.  He was referred to Dr. Jameson Ferrer by Dr. Moody Moreno due to [facility] Oral Tablet 24 Hr Take 1 tablet (50 mg total) by mouth daily. 90 tablet 1   • Blood Glucose Monitoring Suppl (CONTOUR NEXT MONITOR) w/Device Does not apply Kit 1 Application by Does not apply route once daily.  1 kit 0   • Lancets Does not apply Misc Check Vessels Normal Normal    Periphery Normal Normal            Refraction     Wearing Rx       Sphere Cylinder    Right +1.75 Sphere    Left +1.75 Sphere    Age:  not here    Type:  OTC reading only          Manifest Refraction       Sphere Cylinder Axis Dist

## 2020-06-23 NOTE — PATIENT INSTRUCTIONS
Diet-controlled diabetes mellitus ([facility] [address]  Diet controlled diabetes: no background of retinopathy, no signs of neovascularization noted. Discussed ocular and systemic benefits of blood sugar control.   Diagnosis and treatment discussed in detail with patient

## 2020-06-23 NOTE — ASSESSMENT & PLAN NOTE
Discussed mild cataracts in both eyes that are not affecting vision and are not surgical at this time. New optional progressive bifocals;    Or patient wants to continue with over the counter reading, okay to continue with +1.75 or could increase power o

## 2020-11-17 ENCOUNTER — TELEPHONE (OUTPATIENT)
Dept: INTERNAL MEDICINE CLINIC | Facility: CLINIC | Age: 60
End: 2020-11-17

## 2020-11-17 RX ORDER — VALACYCLOVIR HYDROCHLORIDE 1 G/1
1 TABLET, FILM COATED ORAL EVERY 12 HOURS SCHEDULED
Qty: 2 TABLET | Refills: 1 | Status: SHIPPED | OUTPATIENT
Start: 2020-11-17 | End: 2021-03-03

## 2020-11-17 NOTE — TELEPHONE ENCOUNTER
Patient called for a refill for Valtrex. Has a herpes outbreak. Has appointment for next week   Future Appointments   Date Time Provider Reece Gibsoni   11/25/2020  9:00 AM Eliane Kiran MD [id number] [facility] [address]     Verified allergies and pharmacy.

## 2020-11-17 NOTE — TELEPHONE ENCOUNTER
I have sent Valtrex to the pharmacy. Let him take 1 g in the morning and 1 g at night. Hopefully his symptoms will get better.   Please let him know thank you

## 2020-11-25 ENCOUNTER — OFFICE VISIT (OUTPATIENT)
Dept: INTERNAL MEDICINE CLINIC | Facility: CLINIC | Age: 60
End: 2020-11-25
Payer: COMMERCIAL

## 2020-11-25 VITALS
HEIGHT: 71 IN | SYSTOLIC BLOOD PRESSURE: 136 MMHG | RESPIRATION RATE: 14 BRPM | BODY MASS INDEX: 25.9 KG/M2 | TEMPERATURE: 98 F | OXYGEN SATURATION: 97 % | WEIGHT: 185 LBS | HEART RATE: 86 BPM | DIASTOLIC BLOOD PRESSURE: 84 MMHG

## 2020-11-25 DIAGNOSIS — Z23 NEED FOR INFLUENZA VACCINATION: ICD-10-CM

## 2020-11-25 DIAGNOSIS — Z00.00 ADULT GENERAL MEDICAL EXAM: Primary | ICD-10-CM

## 2020-11-25 PROBLEM — F10.239 ALCOHOL DEPENDENCE WITH WITHDRAWAL (HCC): Status: RESOLVED | Noted: 2018-08-30 | Resolved: 2020-11-25

## 2020-11-25 PROBLEM — E78.1 HIGH TRIGLYCERIDES: Status: RESOLVED | Noted: 2018-10-08 | Resolved: 2020-11-25

## 2020-11-25 PROBLEM — E11.9 TYPE 2 DIABETES MELLITUS WITHOUT COMPLICATION, WITHOUT LONG-TERM CURRENT USE OF INSULIN (HCC): Status: RESOLVED | Noted: 2019-12-30 | Resolved: 2020-11-25

## 2020-11-25 PROCEDURE — 3075F SYST BP GE 130 - 139MM HG: CPT | Performed by: INTERNAL MEDICINE

## 2020-11-25 PROCEDURE — 99396 PREV VISIT EST AGE 40-64: CPT | Performed by: INTERNAL MEDICINE

## 2020-11-25 PROCEDURE — 3079F DIAST BP 80-89 MM HG: CPT | Performed by: INTERNAL MEDICINE

## 2020-11-25 PROCEDURE — 99072 ADDL SUPL MATRL&STAF TM PHE: CPT | Performed by: INTERNAL MEDICINE

## 2020-11-25 PROCEDURE — 90686 IIV4 VACC NO PRSV 0.5 ML IM: CPT | Performed by: INTERNAL MEDICINE

## 2020-11-25 PROCEDURE — 90471 IMMUNIZATION ADMIN: CPT | Performed by: INTERNAL MEDICINE

## 2020-11-25 PROCEDURE — 3008F BODY MASS INDEX DOCD: CPT | Performed by: INTERNAL MEDICINE

## 2020-11-25 NOTE — PROGRESS NOTES
Rodney Dong is a 62year old male. Patient presents with:  Physical  Injection: Will like a Flu vaccine     HPI:   71-year-old gentleman with a past medical history of tonsillar cancer, hypertension, diet-controlled diabetes, alcoholic fatty liver-no WISDOM TEETH REMOVED        Social History:  Social History    Tobacco Use      Smoking status: Former Smoker        Types: Cigars      Smokeless tobacco: Former User    Alcohol use: Not Currently    Drug use: No     Family History   Problem Relation Age o Left Ear: Tympanic membrane normal.   Eyes: Conjunctivae and EOM are normal.   Neck: Neck supple. No JVD present. No thyromegaly present. Cardiovascular: Normal rate, regular rhythm and normal heart sounds. No murmur heard. Edema not present. Ruthy Hick ordered. Flu vaccine given.   Instructed patient to follow-up with gastroenterology  Instructed patient to follow-up with oncology at least on an annual basis  I will see him back in 6 months      The patient indicates understanding of these issues and agr

## 2020-12-22 ENCOUNTER — TELEPHONE (OUTPATIENT)
Dept: INTERNAL MEDICINE CLINIC | Facility: CLINIC | Age: 60
End: 2020-12-22

## 2020-12-22 NOTE — TELEPHONE ENCOUNTER
Patient requesting CPT codes for the labs that were ordered for him. Patient would like to make sure all labs are covered by insurance before completing labs. Please advise.

## 2020-12-24 NOTE — TELEPHONE ENCOUNTER
Patient calling back need answers for the correct CPT codes asap         Please advise   [phone]

## 2020-12-31 ENCOUNTER — LAB ENCOUNTER (OUTPATIENT)
Dept: LAB | Age: 60
End: 2020-12-31
Attending: INTERNAL MEDICINE
Payer: COMMERCIAL

## 2020-12-31 DIAGNOSIS — Z00.00 ADULT GENERAL MEDICAL EXAM: ICD-10-CM

## 2020-12-31 LAB
ALBUMIN SERPL-MCNC: 3.8 G/DL (ref 3.4–5)
ALBUMIN/GLOB SERPL: 1 {RATIO} (ref 1–2)
ALP LIVER SERPL-CCNC: 121 U/L
ALT SERPL-CCNC: 58 U/L
ANION GAP SERPL CALC-SCNC: 3 MMOL/L (ref 0–18)
AST SERPL-CCNC: 40 U/L (ref 15–37)
BILIRUB SERPL-MCNC: 0.8 MG/DL (ref 0.1–2)
BUN BLD-MCNC: 17 MG/DL (ref 7–18)
BUN/CREAT SERPL: 16.8 (ref 10–20)
CALCIUM BLD-MCNC: 9.3 MG/DL (ref 8.5–10.1)
CHLORIDE SERPL-SCNC: 108 MMOL/L (ref 98–112)
CHOLEST SMN-MCNC: 160 MG/DL (ref ?–200)
CO2 SERPL-SCNC: 30 MMOL/L (ref 21–32)
COMPLEXED PSA SERPL-MCNC: 1.24 NG/ML (ref ?–4)
CREAT BLD-MCNC: 1.01 MG/DL
CREAT UR-SCNC: 138 MG/DL
DEPRECATED RDW RBC AUTO: 42.4 FL (ref 35.1–46.3)
ERYTHROCYTE [DISTWIDTH] IN BLOOD BY AUTOMATED COUNT: 12.3 % (ref 11–15)
EST. AVERAGE GLUCOSE BLD GHB EST-MCNC: 126 MG/DL (ref 68–126)
GLOBULIN PLAS-MCNC: 4 G/DL (ref 2.8–4.4)
GLUCOSE BLD-MCNC: 128 MG/DL (ref 70–99)
HBA1C MFR BLD HPLC: 6 % (ref ?–5.7)
HCT VFR BLD AUTO: 48.7 %
HDLC SERPL-MCNC: 44 MG/DL (ref 40–59)
HGB BLD-MCNC: 16.4 G/DL
LDLC SERPL CALC-MCNC: 94 MG/DL (ref ?–100)
M PROTEIN MFR SERPL ELPH: 7.8 G/DL (ref 6.4–8.2)
MCH RBC QN AUTO: 31.3 PG (ref 26–34)
MCHC RBC AUTO-ENTMCNC: 33.7 G/DL (ref 31–37)
MCV RBC AUTO: 92.9 FL
MICROALBUMIN UR-MCNC: 1.76 MG/DL
MICROALBUMIN/CREAT 24H UR-RTO: 12.8 UG/MG (ref ?–30)
NONHDLC SERPL-MCNC: 116 MG/DL (ref ?–130)
OSMOLALITY SERPL CALC.SUM OF ELEC: 295 MOSM/KG (ref 275–295)
PATIENT FASTING Y/N/NP: YES
PATIENT FASTING Y/N/NP: YES
PLATELET # BLD AUTO: 172 10(3)UL (ref 150–450)
POTASSIUM SERPL-SCNC: 4 MMOL/L (ref 3.5–5.1)
RBC # BLD AUTO: 5.24 X10(6)UL
SODIUM SERPL-SCNC: 141 MMOL/L (ref 136–145)
TRIGL SERPL-MCNC: 109 MG/DL (ref 30–149)
TSI SER-ACNC: 3.59 MIU/ML (ref 0.36–3.74)
VLDLC SERPL CALC-MCNC: 22 MG/DL (ref 0–30)
WBC # BLD AUTO: 4.9 X10(3) UL (ref 4–11)

## 2020-12-31 PROCEDURE — 82043 UR ALBUMIN QUANTITATIVE: CPT

## 2020-12-31 PROCEDURE — 80053 COMPREHEN METABOLIC PANEL: CPT

## 2020-12-31 PROCEDURE — 82570 ASSAY OF URINE CREATININE: CPT

## 2020-12-31 PROCEDURE — 83036 HEMOGLOBIN GLYCOSYLATED A1C: CPT

## 2020-12-31 PROCEDURE — 85027 COMPLETE CBC AUTOMATED: CPT

## 2020-12-31 PROCEDURE — 36415 COLL VENOUS BLD VENIPUNCTURE: CPT

## 2020-12-31 PROCEDURE — 84443 ASSAY THYROID STIM HORMONE: CPT

## 2020-12-31 PROCEDURE — 80061 LIPID PANEL: CPT

## 2021-01-05 NOTE — TELEPHONE ENCOUNTER
Called patient lmtcb.      JUANI Patient already had his labs done on 12/31/2020 under ICD 10 Code Z00.00

## 2021-03-03 RX ORDER — VALACYCLOVIR HYDROCHLORIDE 1 G/1
1 TABLET, FILM COATED ORAL EVERY 12 HOURS SCHEDULED
Qty: 2 TABLET | Refills: 1 | Status: SHIPPED | OUTPATIENT
Start: 2021-03-03 | End: 2021-10-31

## 2021-03-18 DIAGNOSIS — Z23 NEED FOR VACCINATION: ICD-10-CM

## 2021-04-10 ENCOUNTER — IMMUNIZATION (OUTPATIENT)
Dept: LAB | Age: 61
End: 2021-04-10
Attending: HOSPITALIST
Payer: COMMERCIAL

## 2021-04-10 DIAGNOSIS — Z23 NEED FOR VACCINATION: Primary | ICD-10-CM

## 2021-04-10 PROCEDURE — 0001A SARSCOV2 VAC 30MCG/0.3ML IM: CPT

## 2021-04-15 ENCOUNTER — OFFICE VISIT (OUTPATIENT)
Dept: DERMATOLOGY CLINIC | Facility: CLINIC | Age: 61
End: 2021-04-15
Payer: COMMERCIAL

## 2021-04-15 DIAGNOSIS — D23.30 BENIGN NEOPLASM OF SKIN OF FACE: ICD-10-CM

## 2021-04-15 DIAGNOSIS — L57.0 AK (ACTINIC KERATOSIS): Primary | ICD-10-CM

## 2021-04-15 DIAGNOSIS — L81.4 LENTIGO: ICD-10-CM

## 2021-04-15 DIAGNOSIS — D23.9 BENIGN NEOPLASM OF SKIN, UNSPECIFIED LOCATION: ICD-10-CM

## 2021-04-15 DIAGNOSIS — D23.4 BENIGN NEOPLASM OF SCALP AND SKIN OF NECK: ICD-10-CM

## 2021-04-15 DIAGNOSIS — L82.1 SEBORRHEIC KERATOSES: ICD-10-CM

## 2021-04-15 DIAGNOSIS — D23.5 BENIGN NEOPLASM OF SKIN OF TRUNK, EXCEPT SCROTUM: ICD-10-CM

## 2021-04-15 DIAGNOSIS — D23.60 BENIGN NEOPLASM OF SKIN OF UPPER LIMB, INCLUDING SHOULDER, UNSPECIFIED LATERALITY: ICD-10-CM

## 2021-04-15 DIAGNOSIS — D22.9 MULTIPLE NEVI: ICD-10-CM

## 2021-04-15 PROCEDURE — 17000 DESTRUCT PREMALG LESION: CPT | Performed by: DERMATOLOGY

## 2021-04-15 PROCEDURE — 99203 OFFICE O/P NEW LOW 30 MIN: CPT | Performed by: DERMATOLOGY

## 2021-04-15 PROCEDURE — 17003 DESTRUCT PREMALG LES 2-14: CPT | Performed by: DERMATOLOGY

## 2021-04-25 NOTE — PROGRESS NOTES
Lady Latham is a 62year old male. HPI:     CC:  Patient presents with:  Derm Problem: New patient presents for first time full body check. New brown lesions appearing to face. Denies bleeding. No personal or family hx of skin ca.          Allergies Blood (CONTOUR NEXT TEST) In Vitro Strip Check blood sugar once daily 100 strip 3   • Alcohol Swabs (SM ALCOHOL PREP) Does not apply Pads Check blood sugar once daily 100 each 3     Allergies:   No Known Allergies    Past Medical History:   Diagnosis Date Paying Living Expenses:   Food Insecurity:       Worried About [address] in the Last Year:       Ran Out of Food in the Last Year:   Transportation Needs:       Lack of Transportation (Medical):       Lack of Transportation (Non-Medical):   Physi Well-developed well-nourished patient alert oriented in no acute distress.   Exam total-body performed, including scalp, head, neck, face,nails, hair, external eyes, including conjunctival mucosa, eyelids, lips external ears, back, chest,/ breasts, axillae, suggest HPV vaccine. Will discuss with his PCP. No other susupicious lesions on todays  exam.    Please refer to map for specific lesions. See additional diagnoses. Pros cons of various therapies, risks benefits discussed. Pathophysiology discussed wi

## 2021-05-05 ENCOUNTER — IMMUNIZATION (OUTPATIENT)
Dept: LAB | Age: 61
End: 2021-05-05
Attending: HOSPITALIST
Payer: COMMERCIAL

## 2021-05-05 DIAGNOSIS — Z23 NEED FOR VACCINATION: Primary | ICD-10-CM

## 2021-05-05 PROCEDURE — 0002A SARSCOV2 VAC 30MCG/0.3ML IM: CPT

## 2021-06-14 ENCOUNTER — OFFICE VISIT (OUTPATIENT)
Dept: INTERNAL MEDICINE CLINIC | Facility: CLINIC | Age: 61
End: 2021-06-14
Payer: COMMERCIAL

## 2021-06-14 VITALS
HEART RATE: 62 BPM | RESPIRATION RATE: 16 BRPM | DIASTOLIC BLOOD PRESSURE: 70 MMHG | HEIGHT: 71 IN | WEIGHT: 189 LBS | OXYGEN SATURATION: 97 % | BODY MASS INDEX: 26.46 KG/M2 | SYSTOLIC BLOOD PRESSURE: 118 MMHG

## 2021-06-14 DIAGNOSIS — K70.0 ALCOHOLIC FATTY LIVER: ICD-10-CM

## 2021-06-14 DIAGNOSIS — C09.9 TONSILLAR CANCER (HCC): ICD-10-CM

## 2021-06-14 DIAGNOSIS — I10 ESSENTIAL HYPERTENSION: ICD-10-CM

## 2021-06-14 DIAGNOSIS — E11.9 DIET-CONTROLLED DIABETES MELLITUS (HCC): Primary | ICD-10-CM

## 2021-06-14 PROCEDURE — 3074F SYST BP LT 130 MM HG: CPT | Performed by: INTERNAL MEDICINE

## 2021-06-14 PROCEDURE — 99214 OFFICE O/P EST MOD 30 MIN: CPT | Performed by: INTERNAL MEDICINE

## 2021-06-14 PROCEDURE — 3078F DIAST BP <80 MM HG: CPT | Performed by: INTERNAL MEDICINE

## 2021-06-14 PROCEDURE — 3008F BODY MASS INDEX DOCD: CPT | Performed by: INTERNAL MEDICINE

## 2021-06-14 NOTE — PROGRESS NOTES
Dyan Harvey is a 62year old male. Patient presents with: Follow - Up    HPI:   71-year-old gentleman with a past medical history of hypertension, diet-controlled diabetes, history of alcoholic hepatitis now in remission here for follow-up.   He cont History    Tobacco Use      Smoking status: Former Smoker        Types: Cigars      Smokeless tobacco: Former User    Vaping Use      Vaping Use: Never assessed    Alcohol use: Not Currently    Drug use: No     Family History   Problem Relation Age of Onse Skin:     General: Skin is warm and dry. Neurological:      General: No focal deficit present. Mental Status: He is alert and oriented to person, place, and time. Mental status is at baseline.    Psychiatric:         Mood and Affect: Mood normal.

## 2021-08-19 ENCOUNTER — OFFICE VISIT (OUTPATIENT)
Dept: DERMATOLOGY CLINIC | Facility: CLINIC | Age: 61
End: 2021-08-19
Payer: COMMERCIAL

## 2021-08-19 DIAGNOSIS — L81.4 LENTIGO: ICD-10-CM

## 2021-08-19 DIAGNOSIS — D23.9 BENIGN NEOPLASM OF SKIN, UNSPECIFIED LOCATION: ICD-10-CM

## 2021-08-19 DIAGNOSIS — L82.1 SEBORRHEIC KERATOSES: ICD-10-CM

## 2021-08-19 DIAGNOSIS — D22.9 MULTIPLE NEVI: ICD-10-CM

## 2021-08-19 DIAGNOSIS — L57.0 AK (ACTINIC KERATOSIS): Primary | ICD-10-CM

## 2021-08-19 PROCEDURE — 17000 DESTRUCT PREMALG LESION: CPT | Performed by: DERMATOLOGY

## 2021-08-19 PROCEDURE — 17003 DESTRUCT PREMALG LES 2-14: CPT | Performed by: DERMATOLOGY

## 2021-08-19 PROCEDURE — 99213 OFFICE O/P EST LOW 20 MIN: CPT | Performed by: DERMATOLOGY

## 2021-08-24 ENCOUNTER — OFFICE VISIT (OUTPATIENT)
Dept: OTOLARYNGOLOGY | Facility: CLINIC | Age: 61
End: 2021-08-24
Payer: COMMERCIAL

## 2021-08-24 VITALS — TEMPERATURE: 97 F

## 2021-08-24 DIAGNOSIS — Z85.818 HISTORY OF CANCER TONSIL: Primary | ICD-10-CM

## 2021-08-24 PROCEDURE — 99213 OFFICE O/P EST LOW 20 MIN: CPT | Performed by: OTOLARYNGOLOGY

## 2021-08-24 NOTE — PROGRESS NOTES
Toby Frye is a 62year old male. Patient presents with:   Follow - Up: throat cancer follow up, right side of tongue needs to be checked, feels well      HISTORY OF PRESENT ILLNESS  11/29/2017  Patient prevents for evaluation of a suspected right today for a routine followup visit. 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 He saw Dr. Dariela Carroll who obtained a CT scan of the neck on 12/05/2017.   This demonstrated asymmetric enla Marital status:       Spouse name: Not on file      Number of children: Not on file      Years of education: Not on file      Highest education level: Not on file    Tobacco Use      Smoking status: Former Smoker        Types: Cigars      Smokeless intolerance and heat intolerance. Neuro Negative Tremors. Psych Negative Anxiety and depression. Integumentary Negative Frequent skin infections, pigment change and rash. Hema/Lymph Negative Easy bleeding and easy bruising.            PHYSICAL EXAM Check blood sugar once daily. , Disp: 100 each, Rfl: 3  •  Glucose Blood (CONTOUR NEXT TEST) In Vitro Strip, Check blood sugar once daily, Disp: 100 strip, Rfl: 3  •  Alcohol Swabs (SM ALCOHOL PREP) Does not apply Pads, Check blood sugar once daily, Disp: 1

## 2021-09-05 NOTE — PROGRESS NOTES
Briscoe Bernheim is a 62year old male. HPI:     CC:  Patient presents with:  Upper Body Exam: LOV 4/15/21. Pt requesting upper body exam. Concerned with scaley lesion on left forearm and back.  Pt was also to come back to the office to recheck lesions o not apply Kit 1 Application by Does not apply route once daily. 1 kit 0   • Lancets Does not apply Misc Check blood sugar once daily.  100 each 3   • Glucose Blood (CONTOUR NEXT TEST) In Vitro Strip Check blood sugar once daily 100 strip 3   • Alcohol Swabs Reaction to local anesthetic: No    Social History Narrative      Not on file    Social Determinants of Health  Financial Resource Strain:       Difficulty of Paying Living Expenses:   Food Insecurity:       Worried About Running Out of Food in the Last [place] presents with concerns above. Patient has been in their usual state of health. History, medications, allergies reviewed as noted. ROS:  Denies any other systemic complaints. No new or changeing lesions other than noted above.  No fevers, chills, n papules over the back shoulders arms consistent with benign keratoses reassurance. No other significant changes. Continue careful sun protection recheck in 4 to 6 months or as needed    Extensive sun damage consider retinol daily for face as tolerated.

## 2021-09-29 ENCOUNTER — TELEPHONE (OUTPATIENT)
Dept: INTERNAL MEDICINE CLINIC | Facility: CLINIC | Age: 61
End: 2021-09-29

## 2021-10-31 RX ORDER — VALACYCLOVIR HYDROCHLORIDE 1 G/1
TABLET, FILM COATED ORAL
Qty: 2 TABLET | Refills: 1 | Status: SHIPPED | OUTPATIENT
Start: 2021-10-31

## 2021-11-23 ENCOUNTER — NURSE TRIAGE (OUTPATIENT)
Dept: INTERNAL MEDICINE CLINIC | Facility: CLINIC | Age: 61
End: 2021-11-23

## 2021-11-23 NOTE — TELEPHONE ENCOUNTER
Action Requested: Summary for Provider     []  Critical Lab, Recommendations Needed  [] Need Additional Advice  []   FYI    []   Need Orders  [] Need Medications Sent to Pharmacy  []  Other     SUMMARY:    Home care given.     The patient stated he tested p

## 2021-12-09 ENCOUNTER — OFFICE VISIT (OUTPATIENT)
Dept: INTERNAL MEDICINE CLINIC | Facility: CLINIC | Age: 61
End: 2021-12-09
Payer: COMMERCIAL

## 2021-12-09 VITALS
RESPIRATION RATE: 14 BRPM | SYSTOLIC BLOOD PRESSURE: 134 MMHG | BODY MASS INDEX: 25.9 KG/M2 | HEIGHT: 71 IN | WEIGHT: 185 LBS | HEART RATE: 60 BPM | DIASTOLIC BLOOD PRESSURE: 88 MMHG | OXYGEN SATURATION: 96 %

## 2021-12-09 DIAGNOSIS — Z00.00 ADULT GENERAL MEDICAL EXAM: Primary | ICD-10-CM

## 2021-12-09 DIAGNOSIS — Z23 NEED FOR INFLUENZA VACCINATION: ICD-10-CM

## 2021-12-09 PROCEDURE — 3075F SYST BP GE 130 - 139MM HG: CPT | Performed by: INTERNAL MEDICINE

## 2021-12-09 PROCEDURE — 3079F DIAST BP 80-89 MM HG: CPT | Performed by: INTERNAL MEDICINE

## 2021-12-09 PROCEDURE — 99396 PREV VISIT EST AGE 40-64: CPT | Performed by: INTERNAL MEDICINE

## 2021-12-09 PROCEDURE — 90686 IIV4 VACC NO PRSV 0.5 ML IM: CPT | Performed by: INTERNAL MEDICINE

## 2021-12-09 PROCEDURE — 90471 IMMUNIZATION ADMIN: CPT | Performed by: INTERNAL MEDICINE

## 2021-12-09 PROCEDURE — 3008F BODY MASS INDEX DOCD: CPT | Performed by: INTERNAL MEDICINE

## 2021-12-09 NOTE — PROGRESS NOTES
Toy Quiles is a 62year old male. Patient presents with:  Physical    HPI:   11year-old gentleman here for physical.  I saw him last time in June 2020  Afterwards, he was seen by ENT.   Informed me that no need further follow-up  Will continue to abs TONSILLECTOMY  01/05/2018   • WISDOM TEETH REMOVED        Social History:  Social History    Tobacco Use      Smoking status: Former Smoker        Types: Cigars      Smokeless tobacco: Former User    Vaping Use      Vaping Use: Not on file    Alcohol use: Cervical back: Neck supple. Right lower leg: No edema. Left lower leg: No edema. Skin:     General: Skin is warm and dry. Neurological:      General: No focal deficit present.       Mental Status: He is alert and oriented to person, place, and identified these errors have been corrected.  While every attempt is made to correct errors during dictation discrepancies may still exist.

## 2021-12-21 ENCOUNTER — LAB ENCOUNTER (OUTPATIENT)
Dept: LAB | Age: 61
End: 2021-12-21
Attending: INTERNAL MEDICINE
Payer: COMMERCIAL

## 2021-12-21 ENCOUNTER — APPOINTMENT (OUTPATIENT)
Dept: LAB | Age: 61
End: 2021-12-21
Attending: INTERNAL MEDICINE
Payer: COMMERCIAL

## 2021-12-21 DIAGNOSIS — Z00.00 ADULT GENERAL MEDICAL EXAM: ICD-10-CM

## 2021-12-21 PROCEDURE — 84443 ASSAY THYROID STIM HORMONE: CPT

## 2021-12-21 PROCEDURE — 82043 UR ALBUMIN QUANTITATIVE: CPT

## 2021-12-21 PROCEDURE — 85027 COMPLETE CBC AUTOMATED: CPT

## 2021-12-21 PROCEDURE — 80061 LIPID PANEL: CPT

## 2021-12-21 PROCEDURE — 83036 HEMOGLOBIN GLYCOSYLATED A1C: CPT

## 2021-12-21 PROCEDURE — 82570 ASSAY OF URINE CREATININE: CPT

## 2021-12-21 PROCEDURE — 36415 COLL VENOUS BLD VENIPUNCTURE: CPT

## 2021-12-21 PROCEDURE — 80053 COMPREHEN METABOLIC PANEL: CPT

## 2022-02-07 RX ORDER — BLOOD SUGAR DIAGNOSTIC
STRIP MISCELLANEOUS
Qty: 100 STRIP | Refills: 3 | Status: SHIPPED | OUTPATIENT
Start: 2022-02-07

## 2022-02-07 RX ORDER — GLUCOSAM/CHON-MSM1/C/MANG/BOSW 500-416.6
TABLET ORAL
Qty: 100 EACH | Refills: 3 | Status: SHIPPED | OUTPATIENT
Start: 2022-02-07

## 2022-02-07 NOTE — TELEPHONE ENCOUNTER
Refill passed per [employer] [facility] protocol.     Requested Prescriptions   Pending Prescriptions Disp Refills    TRUEplus Lancets 33G Does not apply Misc [Pharmacy Med Name: TRUE PLUS LANCETS 33G 100S] 100 each 3     Sig: Test blood sugar once daily        Diabetic Supplies Protocol Passed - 2/7/2022  2:07 PM        Passed - Appointment in past 12 or next 3 months           Glucose Blood (CONTOUR NEXT TEST) In Vitro [facility] Med Name: CONTOUR NEXT TEST STRIPS 100S] 100 strip 3     Sig: Test blood sugar once daily        Diabetic Supplies Protocol Passed - 2/7/2022  2:07 PM        Passed - Appointment in past 12 or next 3 months              Recent Outpatient Visits              2 months ago Adult general medical exam    [place] [employer] [facility], [address], MD Maria M    Office Visit    5 months ago History of cancer tonsil    [facility], [address], Milli Oviedo MD    Office Visit    5 months ago AK (actinic keratosis)    [address] Dermatology Remy Watson MD    Office Visit    7 months ago Diet-controlled diabetes mellitus [facility])    [facility], [address], MD Maria M    Office Visit    9 months ago AK (actinic keratosis)    [address] Dermatology Remy Watson MD    Office Visit            Future Appointments         Provider Department Appt Notes    In 2 weeks Remy Watson, [address] [facility] Dermatology 6 mnth f/u    In 3 weeks Mirna Kang MD [facility] Ophthalmology EE

## 2022-02-24 ENCOUNTER — OFFICE VISIT (OUTPATIENT)
Dept: DERMATOLOGY CLINIC | Facility: CLINIC | Age: 62
End: 2022-02-24
Payer: COMMERCIAL

## 2022-02-24 DIAGNOSIS — D22.9 MULTIPLE NEVI: ICD-10-CM

## 2022-02-24 DIAGNOSIS — D23.9 BENIGN NEOPLASM OF SKIN, UNSPECIFIED LOCATION: ICD-10-CM

## 2022-02-24 DIAGNOSIS — L57.0 AK (ACTINIC KERATOSIS): Primary | ICD-10-CM

## 2022-02-24 DIAGNOSIS — L81.4 LENTIGO: ICD-10-CM

## 2022-02-24 DIAGNOSIS — L82.1 SEBORRHEIC KERATOSES: ICD-10-CM

## 2022-02-24 PROCEDURE — 17000 DESTRUCT PREMALG LESION: CPT | Performed by: DERMATOLOGY

## 2022-02-24 PROCEDURE — 99213 OFFICE O/P EST LOW 20 MIN: CPT | Performed by: DERMATOLOGY

## 2022-03-03 ENCOUNTER — OFFICE VISIT (OUTPATIENT)
Dept: OPHTHALMOLOGY | Facility: CLINIC | Age: 62
End: 2022-03-03
Payer: COMMERCIAL

## 2022-03-03 DIAGNOSIS — Z98.890 HX OF LASIK: ICD-10-CM

## 2022-03-03 DIAGNOSIS — E11.9 DIET-CONTROLLED DIABETES MELLITUS (HCC): Primary | ICD-10-CM

## 2022-03-03 DIAGNOSIS — H43.393 FLOATER, VITREOUS, BILATERAL: ICD-10-CM

## 2022-03-03 DIAGNOSIS — H25.13 AGE-RELATED NUCLEAR CATARACT OF BOTH EYES: ICD-10-CM

## 2022-03-03 PROCEDURE — 92015 DETERMINE REFRACTIVE STATE: CPT | Performed by: OPHTHALMOLOGY

## 2022-03-03 PROCEDURE — 92014 COMPRE OPH EXAM EST PT 1/>: CPT | Performed by: OPHTHALMOLOGY

## 2022-03-03 NOTE — PATIENT INSTRUCTIONS
Diet-controlled diabetes mellitus ([facility] [address]  Diet controlled diabetes: no background of retinopathy, no signs of neovascularization noted. Discussed ocular and systemic benefits of blood sugar control. Diagnosis and treatment discussed in detail with patient. Floater, vitreous, bilateral  No treatment. Hx of LASIK  Patient had LASIK in both eyes. Age-related nuclear cataract of both eyes  Discussed mild cataracts in both eyes that are not affecting vision and are not surgical at this time. New optional progressive bifocals. Patient may get distance only and continue with +1.75 over the counter for reading.

## 2022-03-03 NOTE — ASSESSMENT & PLAN NOTE
Discussed mild cataracts in both eyes that are not affecting vision and are not surgical at this time. New optional progressive bifocals. Patient may get distance only and continue with +1.75 over the counter for reading.

## 2022-08-25 ENCOUNTER — OFFICE VISIT (OUTPATIENT)
Dept: DERMATOLOGY CLINIC | Facility: CLINIC | Age: 62
End: 2022-08-25
Payer: COMMERCIAL

## 2022-08-25 DIAGNOSIS — L81.4 LENTIGO: ICD-10-CM

## 2022-08-25 DIAGNOSIS — D22.9 MULTIPLE NEVI: ICD-10-CM

## 2022-08-25 DIAGNOSIS — L82.1 SEBORRHEIC KERATOSES: ICD-10-CM

## 2022-08-25 DIAGNOSIS — L57.0 AK (ACTINIC KERATOSIS): Primary | ICD-10-CM

## 2022-08-25 DIAGNOSIS — D23.9 BENIGN NEOPLASM OF SKIN, UNSPECIFIED LOCATION: ICD-10-CM

## 2022-08-25 PROCEDURE — 99213 OFFICE O/P EST LOW 20 MIN: CPT | Performed by: DERMATOLOGY

## 2022-09-26 NOTE — TELEPHONE ENCOUNTER
Please review. Protocol failed/ No protocol      Requested Prescriptions   Pending Prescriptions Disp Refills    VALACYCLOVIR 1 G Oral Tab [Pharmacy Med Name: VALACYCLOVIR 1GM TABLETS] 2 tablet 1     Sig: TAKE 1 TABLET BY MOUTH EVERY 12 HOURS FOR 1 DAY        There is no refill protocol information for this order                Recent Outpatient Visits              1 month ago AK (actinic keratosis)    [address] [facility],[address] Floor Dermatology Yi Stone MD    Office Visit    6 months ago Diet-controlled diabetes mellitus [facility])    [facility] Sara Russell MD    Office Visit    7 months ago AK (actinic keratosis)    [address] [facility],[address] Floor Dermatology Yi Stone MD    Office Visit    9 months ago Adult general medical exam    [facility], Zully Rodriguez MD    Office Visit    1 year ago History of cancer tonsil    [facility] Romy Wilkerson [facility]    Office Visit

## 2022-09-27 RX ORDER — VALACYCLOVIR HYDROCHLORIDE 1 G/1
TABLET, FILM COATED ORAL
Qty: 2 TABLET | Refills: 1 | Status: SHIPPED | OUTPATIENT
Start: 2022-09-27

## 2022-12-16 ENCOUNTER — LAB ENCOUNTER (OUTPATIENT)
Dept: LAB | Age: 62
End: 2022-12-16
Attending: NURSE PRACTITIONER
Payer: COMMERCIAL

## 2022-12-16 ENCOUNTER — OFFICE VISIT (OUTPATIENT)
Dept: INTERNAL MEDICINE CLINIC | Facility: CLINIC | Age: 62
End: 2022-12-16
Payer: COMMERCIAL

## 2022-12-16 VITALS
SYSTOLIC BLOOD PRESSURE: 137 MMHG | DIASTOLIC BLOOD PRESSURE: 89 MMHG | BODY MASS INDEX: 26.46 KG/M2 | WEIGHT: 189 LBS | HEART RATE: 56 BPM | HEIGHT: 71 IN

## 2022-12-16 DIAGNOSIS — E11.9 DIET-CONTROLLED DIABETES MELLITUS (HCC): ICD-10-CM

## 2022-12-16 DIAGNOSIS — Z12.5 PROSTATE CANCER SCREENING: ICD-10-CM

## 2022-12-16 DIAGNOSIS — Z00.00 ANNUAL PHYSICAL EXAM: ICD-10-CM

## 2022-12-16 DIAGNOSIS — I10 ESSENTIAL HYPERTENSION: ICD-10-CM

## 2022-12-16 DIAGNOSIS — Z71.85 VACCINE COUNSELING: ICD-10-CM

## 2022-12-16 DIAGNOSIS — Z12.11 COLON CANCER SCREENING: ICD-10-CM

## 2022-12-16 DIAGNOSIS — Z00.00 ANNUAL PHYSICAL EXAM: Primary | ICD-10-CM

## 2022-12-16 LAB
ALBUMIN SERPL-MCNC: 4.1 G/DL (ref 3.4–5)
ALBUMIN/GLOB SERPL: 1.1 {RATIO} (ref 1–2)
ALP LIVER SERPL-CCNC: 69 U/L
ALT SERPL-CCNC: 44 U/L
ANION GAP SERPL CALC-SCNC: 4 MMOL/L (ref 0–18)
AST SERPL-CCNC: 27 U/L (ref 15–37)
BASOPHILS # BLD AUTO: 0.05 X10(3) UL (ref 0–0.2)
BASOPHILS NFR BLD AUTO: 1 %
BILIRUB SERPL-MCNC: 0.6 MG/DL (ref 0.1–2)
BUN BLD-MCNC: 27 MG/DL (ref 7–18)
BUN/CREAT SERPL: 26 (ref 10–20)
CALCIUM BLD-MCNC: 9.2 MG/DL (ref 8.5–10.1)
CHLORIDE SERPL-SCNC: 106 MMOL/L (ref 98–112)
CHOLEST SERPL-MCNC: 170 MG/DL (ref ?–200)
CO2 SERPL-SCNC: 29 MMOL/L (ref 21–32)
COMPLEXED PSA SERPL-MCNC: 1.78 NG/ML (ref ?–4)
CREAT BLD-MCNC: 1.04 MG/DL
CREAT UR-SCNC: 144 MG/DL
DEPRECATED RDW RBC AUTO: 40.5 FL (ref 35.1–46.3)
EOSINOPHIL # BLD AUTO: 0.13 X10(3) UL (ref 0–0.7)
EOSINOPHIL NFR BLD AUTO: 2.7 %
ERYTHROCYTE [DISTWIDTH] IN BLOOD BY AUTOMATED COUNT: 11.9 % (ref 11–15)
EST. AVERAGE GLUCOSE BLD GHB EST-MCNC: 126 MG/DL (ref 68–126)
FASTING PATIENT LIPID ANSWER: YES
FASTING STATUS PATIENT QL REPORTED: YES
GFR SERPLBLD BASED ON 1.73 SQ M-ARVRAT: 82 ML/MIN/1.73M2 (ref 60–?)
GLOBULIN PLAS-MCNC: 3.9 G/DL (ref 2.8–4.4)
GLUCOSE BLD-MCNC: 135 MG/DL (ref 70–99)
HBA1C MFR BLD: 6 % (ref ?–5.7)
HCT VFR BLD AUTO: 51.3 %
HDLC SERPL-MCNC: 45 MG/DL (ref 40–59)
HGB BLD-MCNC: 17.1 G/DL
IMM GRANULOCYTES # BLD AUTO: 0.01 X10(3) UL (ref 0–1)
IMM GRANULOCYTES NFR BLD: 0.2 %
LDLC SERPL CALC-MCNC: 105 MG/DL (ref ?–100)
LYMPHOCYTES # BLD AUTO: 1.12 X10(3) UL (ref 1–4)
LYMPHOCYTES NFR BLD AUTO: 23 %
MCH RBC QN AUTO: 30.6 PG (ref 26–34)
MCHC RBC AUTO-ENTMCNC: 33.3 G/DL (ref 31–37)
MCV RBC AUTO: 91.9 FL
MICROALBUMIN UR-MCNC: 3.52 MG/DL
MICROALBUMIN/CREAT 24H UR-RTO: 24.4 UG/MG (ref ?–30)
MONOCYTES # BLD AUTO: 0.53 X10(3) UL (ref 0.1–1)
MONOCYTES NFR BLD AUTO: 10.9 %
NEUTROPHILS # BLD AUTO: 3.04 X10 (3) UL (ref 1.5–7.7)
NEUTROPHILS # BLD AUTO: 3.04 X10(3) UL (ref 1.5–7.7)
NEUTROPHILS NFR BLD AUTO: 62.2 %
NONHDLC SERPL-MCNC: 125 MG/DL (ref ?–130)
OSMOLALITY SERPL CALC.SUM OF ELEC: 295 MOSM/KG (ref 275–295)
PLATELET # BLD AUTO: 193 10(3)UL (ref 150–450)
POTASSIUM SERPL-SCNC: 4.5 MMOL/L (ref 3.5–5.1)
PROT SERPL-MCNC: 8 G/DL (ref 6.4–8.2)
RBC # BLD AUTO: 5.58 X10(6)UL
SODIUM SERPL-SCNC: 139 MMOL/L (ref 136–145)
TRIGL SERPL-MCNC: 110 MG/DL (ref 30–149)
TSI SER-ACNC: 2.26 MIU/ML (ref 0.36–3.74)
VLDLC SERPL CALC-MCNC: 19 MG/DL (ref 0–30)
WBC # BLD AUTO: 4.9 X10(3) UL (ref 4–11)

## 2022-12-16 PROCEDURE — 83036 HEMOGLOBIN GLYCOSYLATED A1C: CPT

## 2022-12-16 PROCEDURE — 3079F DIAST BP 80-89 MM HG: CPT | Performed by: NURSE PRACTITIONER

## 2022-12-16 PROCEDURE — 80061 LIPID PANEL: CPT

## 2022-12-16 PROCEDURE — 3061F NEG MICROALBUMINURIA REV: CPT | Performed by: NURSE PRACTITIONER

## 2022-12-16 PROCEDURE — 80053 COMPREHEN METABOLIC PANEL: CPT

## 2022-12-16 PROCEDURE — 82043 UR ALBUMIN QUANTITATIVE: CPT

## 2022-12-16 PROCEDURE — 3075F SYST BP GE 130 - 139MM HG: CPT | Performed by: NURSE PRACTITIONER

## 2022-12-16 PROCEDURE — 3008F BODY MASS INDEX DOCD: CPT | Performed by: NURSE PRACTITIONER

## 2022-12-16 PROCEDURE — 99396 PREV VISIT EST AGE 40-64: CPT | Performed by: NURSE PRACTITIONER

## 2022-12-16 PROCEDURE — 82570 ASSAY OF URINE CREATININE: CPT

## 2022-12-16 PROCEDURE — 85025 COMPLETE CBC W/AUTO DIFF WBC: CPT

## 2022-12-16 PROCEDURE — 3044F HG A1C LEVEL LT 7.0%: CPT | Performed by: NURSE PRACTITIONER

## 2022-12-16 PROCEDURE — 84443 ASSAY THYROID STIM HORMONE: CPT

## 2022-12-16 PROCEDURE — 36415 COLL VENOUS BLD VENIPUNCTURE: CPT

## 2023-01-03 ENCOUNTER — TELEPHONE (OUTPATIENT)
Dept: OPHTHALMOLOGY | Facility: CLINIC | Age: 63
End: 2023-01-03

## 2023-01-03 ENCOUNTER — OFFICE VISIT (OUTPATIENT)
Dept: OPHTHALMOLOGY | Facility: CLINIC | Age: 63
End: 2023-01-03
Payer: COMMERCIAL

## 2023-01-03 DIAGNOSIS — H43.11 VITREOUS HEMORRHAGE OF RIGHT EYE (HCC): Primary | ICD-10-CM

## 2023-01-03 DIAGNOSIS — H25.13 AGE-RELATED NUCLEAR CATARACT OF BOTH EYES: ICD-10-CM

## 2023-01-03 DIAGNOSIS — H43.393 FLOATER, VITREOUS, BILATERAL: ICD-10-CM

## 2023-01-03 DIAGNOSIS — E11.9 DIET-CONTROLLED DIABETES MELLITUS (HCC): ICD-10-CM

## 2023-01-03 DIAGNOSIS — Z98.890 HX OF LASIK: ICD-10-CM

## 2023-01-03 PROCEDURE — 92014 COMPRE OPH EXAM EST PT 1/>: CPT | Performed by: OPHTHALMOLOGY

## 2023-01-03 NOTE — PATIENT INSTRUCTIONS
Age-related nuclear cataract of both eyes  Discussed mild cataracts in both eyes. Diet-controlled diabetes mellitus ([address]  Diet controlled diabetes: no background of retinopathy, no signs of neovascularization noted. Discussed ocular and systemic benefits of blood sugar control. Diagnosis and treatment discussed in detail with patient. Floater, vitreous, bilateral  No treatment. Hx of LASIK  Patient had LASIK in both eyes. Vitreous hemorrhage of right eye ([address]  Pt is seeing Dr. Maite Flores today 5:00pm for consultation and treatment. Information given.

## 2023-01-03 NOTE — TELEPHONE ENCOUNTER
Per pt condition is getting worse, asking if he should come in sooner than scheduled appt.  Please advise

## 2023-01-03 NOTE — TELEPHONE ENCOUNTER
Patient requesting appointment for floater in right eye, seeing dark cloud. Please call at [phone],[employer].

## 2023-01-03 NOTE — TELEPHONE ENCOUNTER
Patient is very concerned and feels like right eye is worsening. Told him if he can get here by 3:45 today, he could be seen today. Patient gladly accepted.

## 2023-01-03 NOTE — TELEPHONE ENCOUNTER
Patient notices a large black floater in the right eye for the past 2-3 days. Vision is \"hazy\" OD. He does not notice a curtain or veil over vision. He did not see any flashes of light.  Appointment was scheduled 1/4/23 at 9:45 am.

## 2023-01-13 ENCOUNTER — OFFICE VISIT (OUTPATIENT)
Dept: INTERNAL MEDICINE CLINIC | Facility: CLINIC | Age: 63
End: 2023-01-13

## 2023-01-13 VITALS
HEIGHT: 71 IN | SYSTOLIC BLOOD PRESSURE: 130 MMHG | DIASTOLIC BLOOD PRESSURE: 80 MMHG | OXYGEN SATURATION: 96 % | HEART RATE: 60 BPM | BODY MASS INDEX: 26.74 KG/M2 | WEIGHT: 191 LBS | RESPIRATION RATE: 14 BRPM

## 2023-01-13 DIAGNOSIS — Z01.818 PRE-OPERATIVE CLEARANCE: Primary | ICD-10-CM

## 2023-01-13 PROCEDURE — 3008F BODY MASS INDEX DOCD: CPT | Performed by: NURSE PRACTITIONER

## 2023-01-13 PROCEDURE — 3075F SYST BP GE 130 - 139MM HG: CPT | Performed by: NURSE PRACTITIONER

## 2023-01-13 PROCEDURE — 3079F DIAST BP 80-89 MM HG: CPT | Performed by: NURSE PRACTITIONER

## 2023-01-13 PROCEDURE — 99214 OFFICE O/P EST MOD 30 MIN: CPT | Performed by: NURSE PRACTITIONER

## 2023-01-13 NOTE — PATIENT INSTRUCTIONS
Hold all supplements 5 days prior to surgery     Avoid taking any NSAID's (ibuprofen, motrin or aleve)  Use tylenol for pain if needed

## 2023-01-16 ENCOUNTER — TELEPHONE (OUTPATIENT)
Dept: INTERNAL MEDICINE CLINIC | Facility: CLINIC | Age: 63
End: 2023-01-16

## 2023-01-16 NOTE — TELEPHONE ENCOUNTER
Sabrina-Dr. Doni Alvarez, Retina Specialist requesting medical clearance due to surgery is tomorrow for patient, please fax today by to 3pm [phone].

## 2023-02-10 ENCOUNTER — NURSE ONLY (OUTPATIENT)
Facility: CLINIC | Age: 63
End: 2023-02-10

## 2023-02-10 DIAGNOSIS — Z86.010 PERSONAL HISTORY OF COLONIC POLYPS: Primary | ICD-10-CM

## 2023-02-14 RX ORDER — POLYETHYLENE GLYCOL 3350, SODIUM CHLORIDE, SODIUM BICARBONATE, POTASSIUM CHLORIDE 420; 11.2; 5.72; 1.48 G/4L; G/4L; G/4L; G/4L
POWDER, FOR SOLUTION ORAL
Qty: 4000 ML | Refills: 0 | Status: SHIPPED | OUTPATIENT
Start: 2023-02-14

## 2023-02-15 ENCOUNTER — MED REC SCAN ONLY (OUTPATIENT)
Facility: CLINIC | Age: 63
End: 2023-02-15

## 2023-02-16 ENCOUNTER — MED REC SCAN ONLY (OUTPATIENT)
Facility: CLINIC | Age: 63
End: 2023-02-16

## 2023-04-21 ENCOUNTER — TELEPHONE (OUTPATIENT)
Facility: CLINIC | Age: 63
End: 2023-04-21

## 2023-04-21 NOTE — TELEPHONE ENCOUNTER
Patient requesting RN to forward preps rx for 5/12/2023 CLN to pharmacy and also has questions regarding instructions. Please forward instructions to [employer]. Please call. Thank you.

## 2023-04-21 NOTE — TELEPHONE ENCOUNTER
Spoke with patient and informed him that his prep kit is on file at the pharmacy he has on file and he can call and have it filled, also advised patient that he prep instructions was sent through [employer] on 2/15/23. Patient stated that he haven't checked message yet. Informed patient if he need instructions again to reach out to us. No further needed at this time.

## 2023-04-26 ENCOUNTER — OFFICE VISIT (OUTPATIENT)
Dept: DERMATOLOGY CLINIC | Facility: CLINIC | Age: 63
End: 2023-04-26

## 2023-04-26 DIAGNOSIS — D22.9 MULTIPLE NEVI: ICD-10-CM

## 2023-04-26 DIAGNOSIS — L81.4 LENTIGO: ICD-10-CM

## 2023-04-26 DIAGNOSIS — L82.1 SEBORRHEIC KERATOSES: Primary | ICD-10-CM

## 2023-04-26 DIAGNOSIS — L57.0 AK (ACTINIC KERATOSIS): ICD-10-CM

## 2023-04-26 DIAGNOSIS — D23.9 BENIGN NEOPLASM OF SKIN, UNSPECIFIED LOCATION: ICD-10-CM

## 2023-04-26 PROCEDURE — 99213 OFFICE O/P EST LOW 20 MIN: CPT | Performed by: DERMATOLOGY

## 2023-05-12 ENCOUNTER — ANESTHESIA EVENT (OUTPATIENT)
Dept: ENDOSCOPY | Facility: HOSPITAL | Age: 63
End: 2023-05-12
Payer: COMMERCIAL

## 2023-05-12 ENCOUNTER — ANESTHESIA (OUTPATIENT)
Dept: ENDOSCOPY | Facility: HOSPITAL | Age: 63
End: 2023-05-12
Payer: COMMERCIAL

## 2023-05-12 ENCOUNTER — HOSPITAL ENCOUNTER (OUTPATIENT)
Facility: HOSPITAL | Age: 63
Setting detail: HOSPITAL OUTPATIENT SURGERY
Discharge: HOME | End: 2023-05-12
Attending: INTERNAL MEDICINE | Admitting: INTERNAL MEDICINE
Payer: COMMERCIAL

## 2023-05-12 VITALS
HEART RATE: 60 BPM | TEMPERATURE: 98 F | BODY MASS INDEX: 26.6 KG/M2 | HEIGHT: 71 IN | DIASTOLIC BLOOD PRESSURE: 98 MMHG | WEIGHT: 190 LBS | OXYGEN SATURATION: 96 % | SYSTOLIC BLOOD PRESSURE: 122 MMHG | RESPIRATION RATE: 17 BRPM

## 2023-05-12 DIAGNOSIS — Z86.010 PERSONAL HISTORY OF COLONIC POLYPS: ICD-10-CM

## 2023-05-12 PROCEDURE — 0DBK8ZX EXCISION OF ASCENDING COLON, VIA NATURAL OR ARTIFICIAL OPENING ENDOSCOPIC, DIAGNOSTIC: ICD-10-PCS | Performed by: INTERNAL MEDICINE

## 2023-05-12 PROCEDURE — 45385 COLONOSCOPY W/LESION REMOVAL: CPT | Performed by: INTERNAL MEDICINE

## 2023-05-12 RX ORDER — SODIUM CHLORIDE, SODIUM LACTATE, POTASSIUM CHLORIDE, CALCIUM CHLORIDE 600; 310; 30; 20 MG/100ML; MG/100ML; MG/100ML; MG/100ML
INJECTION, SOLUTION INTRAVENOUS CONTINUOUS
Status: DISCONTINUED | OUTPATIENT
Start: 2023-05-12 | End: 2023-05-12

## 2023-05-12 RX ORDER — DEXTROSE MONOHYDRATE 25 G/50ML
50 INJECTION, SOLUTION INTRAVENOUS
Status: DISCONTINUED | OUTPATIENT
Start: 2023-05-12 | End: 2023-05-12

## 2023-05-12 RX ORDER — NICOTINE POLACRILEX 4 MG
15 LOZENGE BUCCAL
Status: DISCONTINUED | OUTPATIENT
Start: 2023-05-12 | End: 2023-05-12

## 2023-05-12 RX ORDER — GLYCOPYRROLATE 0.2 MG/ML
INJECTION, SOLUTION INTRAMUSCULAR; INTRAVENOUS AS NEEDED
Status: DISCONTINUED | OUTPATIENT
Start: 2023-05-12 | End: 2023-05-12 | Stop reason: SURG

## 2023-05-12 RX ORDER — NALOXONE HYDROCHLORIDE 0.4 MG/ML
80 INJECTION, SOLUTION INTRAMUSCULAR; INTRAVENOUS; SUBCUTANEOUS AS NEEDED
Status: DISCONTINUED | OUTPATIENT
Start: 2023-05-12 | End: 2023-05-12

## 2023-05-12 RX ORDER — ONDANSETRON 2 MG/ML
4 INJECTION INTRAMUSCULAR; INTRAVENOUS ONCE AS NEEDED
Status: DISCONTINUED | OUTPATIENT
Start: 2023-05-12 | End: 2023-05-12

## 2023-05-12 RX ORDER — NICOTINE POLACRILEX 4 MG
30 LOZENGE BUCCAL
Status: DISCONTINUED | OUTPATIENT
Start: 2023-05-12 | End: 2023-05-12

## 2023-05-12 RX ADMIN — SODIUM CHLORIDE, SODIUM LACTATE, POTASSIUM CHLORIDE, CALCIUM CHLORIDE: 600; 310; 30; 20 INJECTION, SOLUTION INTRAVENOUS at 10:05:00

## 2023-05-12 RX ADMIN — GLYCOPYRROLATE 0.2 MG: 0.2 INJECTION, SOLUTION INTRAMUSCULAR; INTRAVENOUS at 10:05:00

## 2023-05-12 NOTE — DISCHARGE INSTRUCTIONS

## 2023-05-12 NOTE — OPERATIVE REPORT
[facility] Endoscopy Report      Preoperative Diagnosis:  - history of colon polyps       Postoperative Diagnosis:  - colon polyp x 1  - pan-diverticular disease  - small internal hemorrhoids      Procedure:    Colonoscopy         Surgeon:  Travon Ceballos M.D. Anesthesia:  MAC     Technique:  After informed consent, the patient was placed in the left lateral recumbent position. Digital rectal examination revealed no palpable intraluminal abnormalities. An Olympus variable stiffness 190 series HD colonoscope was inserted into the rectum and advanced under direct vision by following the lumen to the cecum. The colon was examined upon withdrawal in the left lateral position. The procedure was well tolerated without immediate complication. Findings:  The preparation of the colon was good. The terminal ileum was examined for 4 cm and visually normal.  The ileocecal valve was well preserved. The visualized colonic mucosa from the cecum to the anal verge was normal with an intact vascular pattern. Ascending colon polyp x1, sessile 1 cm cold snare removed with placement 1 clip across the mucosal defect. No bleeding at the polypectomy site and specimens retrieved and sent for analysis. Diverticular disease extending throughout the entire colon, no diverticulitis. Small internal hemorrhoids noted on retroflexed view. Estimated blood loss-insignificant  Specimens-colon polyp      Impression:  - colon polyp x 1  - pan-diverticular disease  - small internal hemorrhoids    Recommendations:  - Post polypectomy instructions given  - Repeat colonoscopy in 3 years  - High fiber diet for diverticular disease  - Symptomatic treatment of hemorrhoids          Yamileth Segura.  Fabian Carter MD  5/12/2023  10:43 AM

## 2023-05-12 NOTE — ANESTHESIA POSTPROCEDURE EVALUATION
Patient: Garrett Mediate    Procedure Summary     Date: 05/12/23 Room / Location: [address] [place] [address] ENDOSCOPY 01 / [address] [place] [address] ENDOSCOPY    Anesthesia Start: 2077 Anesthesia Stop: 7931    Procedure: COLONOSCOPY Diagnosis:       Personal history of colonic polyps      (colon polyp, hemorrhoids, diverticulosis)    Surgeons: Lety Shay MD Anesthesiologist: Tia Rothman CRNA    Anesthesia Type: MAC ASA Status: 3          Anesthesia Type: MAC    Vitals Value Taken Time   /73 05/12/23 1041   Temp  05/12/23 1042   Pulse 75 05/12/23 1041   Resp 9 05/12/23 1041   SpO2 96 % 05/12/23 1041       [address] [place] [address] AN Post Evaluation:   Patient Evaluated in PACU  Patient Participation: complete - patient participated  Level of Consciousness: sleepy but conscious  Pain Score: 0  Pain Management: adequate  Airway Patency:patent  Dental exam unchanged from preop  Yes    Cardiovascular Status: acceptable and hemodynamically stable  Respiratory Status: acceptable and room air  Postoperative Hydration acceptable      Jw Frederick CRNA  5/12/2023 10:42 AM

## 2023-05-15 ENCOUNTER — TELEPHONE (OUTPATIENT)
Facility: CLINIC | Age: 63
End: 2023-05-15

## 2023-05-15 NOTE — TELEPHONE ENCOUNTER
Health maintenance updated.      Last colonoscopy done 5/12/2023 by Dr Panchito Garcia    Recall placed into Pt Outreach, next due on 5/2026 per Dr. Panchito Garcia

## 2023-05-19 ENCOUNTER — MED REC SCAN ONLY (OUTPATIENT)
Facility: CLINIC | Age: 63
End: 2023-05-19

## 2023-10-09 RX ORDER — GLUCOSAM/CHON-MSM1/C/MANG/BOSW 500-416.6
TABLET ORAL
Qty: 100 EACH | Refills: 3 | Status: SHIPPED | OUTPATIENT
Start: 2023-10-09

## 2023-10-09 RX ORDER — PERPHENAZINE 16 MG/1
TABLET, FILM COATED ORAL
Qty: 100 EACH | Refills: 3 | Status: SHIPPED | OUTPATIENT
Start: 2023-10-09

## 2023-10-09 NOTE — TELEPHONE ENCOUNTER
Refill passed per [place] [employer] [place] protocol.   Requested Prescriptions   Pending Prescriptions Disp Refills    TRUEPLUS LANCETS 33G Does not apply Misc [Pharmacy Med Name: TRUE PLUS LANCETS 33G 100S] 100 each 3     Sig: Test blood sugar once daily       Diabetic Supplies Protocol Passed - 10/6/2023 11:31 AM        Passed - In person appointment or virtual visit in the past 12 mos or appointment in next 3 mos     Recent Outpatient Visits              5 months ago Seborrheic keratoses    [address] [facility]    Office Visit    8 months ago Personal history of colonic [address], [address], [place]    Nurse Only    8 months ago Pre-operative clearance    [address] [facility], [facility], Kady Aguila, APRAUGUSTIN    Office Visit    9 months ago Vitreous hemorrhage of right eye ([address]    [address], [address], [place], Rosaline Lord MD    Office Visit    9 months ago Annual physical exam    [address], [address] [facility], [facility], Kady Aguila, APRAUGUSTIN    Office Visit          Future Appointments         Provider Department Appt Notes    In 2 weeks Taurus Lombardo MD [address] [facility], [address] 6 month    In 2 months Tanja Lemus MD [address] [place], [address] last px 12/16/2022                      CONTOUR NEXT TEST In Vitro [facility] Med Name: CONTOUR NEXT TEST STRIPS 100S] 100 strip 3     Sig: Test blood sugar once daily       Diabetic Supplies Protocol Passed - 10/6/2023 11:31 AM        Passed - In person appointment or virtual visit in the past 12 mos or appointment in next 3 mos     Recent Outpatient Visits              5 months ago Seborrheic keratoses    [address] Taurus Lombardo MD    Office Visit    8 months ago Personal history of colonic polyps    [facility], [address], [place]    Nurse Only    8 months ago Pre-operative clearance    [facility], [address], [facility], [facility]    Office Visit    9 months ago Vitreous hemorrhage of right eye [facility])    Parisa Romano, [address], [place] Kennedi Weinberg MD    Office Visit    9 months ago Annual physical exam    Parisa Romano, [address], [facility], APRN    Office Visit          Future Appointments         Provider Department Appt Notes    In 2 weeks MD Parisa Fox, [address], [place] 6 month    In 2 months MD Parisa Donovan, [address] last px 12/16/2022                       Recent Outpatient Visits              5 months ago Seborrheic keratoses    [facility], [place] Juan Carcamo MD    Office Visit    8 months ago Personal history of colonic [address], [address], [place]    Nurse Only    8 months ago Pre-operative clearance    Parisa Romano, [address] [facility], [address] [place] [address], [facility]    Office Visit    9 months ago Vitreous hemorrhage of right eye [facility])    Parisa Romano, [address], [place] Kennedi Weinberg MD    Office Visit    9 months ago Annual physical exam    Parisa Romano, [address] [facility], [address] [place] [address], [facility]    Office Visit          Future Appointments         Provider Department Appt Notes    In 2 weeks MD Parisa Fox, [address], [place] 6 month    In 2 months MD Parisa Donovan, [address] [facility], [address] last px 12/16/2022

## 2023-10-20 ENCOUNTER — MED REC SCAN ONLY (OUTPATIENT)
Dept: INTERNAL MEDICINE CLINIC | Facility: CLINIC | Age: 63
End: 2023-10-20

## 2023-10-27 ENCOUNTER — OFFICE VISIT (OUTPATIENT)
Dept: DERMATOLOGY CLINIC | Facility: CLINIC | Age: 63
End: 2023-10-27

## 2023-10-27 DIAGNOSIS — D23.9 BENIGN NEOPLASM OF SKIN, UNSPECIFIED LOCATION: ICD-10-CM

## 2023-10-27 DIAGNOSIS — D22.9 MULTIPLE NEVI: ICD-10-CM

## 2023-10-27 DIAGNOSIS — L81.4 LENTIGO: ICD-10-CM

## 2023-10-27 DIAGNOSIS — L82.1 SEBORRHEIC KERATOSES: ICD-10-CM

## 2023-10-27 DIAGNOSIS — L82.0 INFLAMED SEBORRHEIC KERATOSIS: Primary | ICD-10-CM

## 2023-10-27 DIAGNOSIS — L57.0 AK (ACTINIC KERATOSIS): ICD-10-CM

## 2023-10-27 PROCEDURE — 99213 OFFICE O/P EST LOW 20 MIN: CPT | Performed by: DERMATOLOGY

## 2023-11-06 NOTE — PROGRESS NOTES
Onesimo Arriola is a 59year old male. HPI:     CC:  Patient presents with:  Full Skin Exam: LOV 4/26/23; presents with a spot on left cheek in beard hairline he just noticed; denies bleeding tenderness or flaking. History of extensive sun exposure         Allergies:  Patient has no known allergies.     HISTORY:    Past Medical History:   Diagnosis Date    Alcohol dependence with withdrawal ([facility] [address] 08/30/2018    Arthritis     Cancer ([facility] [address]     squamous cell right tonsil    Decorative tattoo     Elevated fasting blood sugar 10/08/2018    Essential hypertension     High blood pressure     High triglycerides 10/08/2018    Retinal detachment, right 01/04/2023    Dr. Tatyana Renteria    Thrombocytopenia ([facility] [address] 12/10/2019    Type 2 diabetes mellitus without complication, without long-term current use of insulin ([facility] [address] 12/30/2019      Past Surgical History:   Procedure Laterality Date    COLONOSCOPY  12/26/2019    Dr. Alex Blunt: 5 adenomas, tics, int hemorrhoids - 3 year recall    COLONOSCOPY N/A 5/12/2023    Procedure: COLONOSCOPY;  Surgeon: Josselyn Blair MD;  Location: [facility] ENDOSCOPY    FOCAL LASER - OD - RIGHT EYE Right 01/04/2023    thermal laser photocoagulation for retinal detachment/horseshoe tear superotemporal    HIP REPLACEMENT SURGERY Left 11/2018    LASIK Bilateral ~2005    Dr. Sierra Corbett- monovision- OD for distance OS for near    RAD RESEC TONSIL/PILLARS  01/05/2018    REMOVAL OF TONSILS,12+ Y/O  01/05/2018    TONSILLECTOMY  01/05/2018    Isaac Allison RX Right 01/17/2023    Dr. Grayson Elder History   Problem Relation Age of Onset    Cancer Mother         uterine    Diabetes Neg     Glaucoma Neg     Macular degeneration Neg       Social History     Socioeconomic History    Marital status:    Tobacco Use    Smoking status: Former     Types: Cigars    Smokeless tobacco: Former   Substance and Sexual Activity    Alcohol use: Not Currently    Drug use: No    Sexual activity: Not Currently     Partners: Female   Other Topics Concern    Reaction to local anesthetic No    Pt has a pacemaker No    Pt has a defibrillator No        Current Outpatient Medications   Medication Sig Dispense Refill    TRUEplus Lancets 33G Does not apply Misc Test blood sugar once daily 100 each 3    Glucose Blood (CONTOUR NEXT TEST) In Vitro Strip Test blood sugar once daily 100 each 3    PEG 3350-KCl-Na Bicarb-NaCl (TRILYTE) 420 g Oral Recon Soln Take prep as directed by gastro office. May substitute with Trilyte/generic equivalent if needed. (Patient not taking: Reported on 4/26/2023) 4000 mL 0    VALACYCLOVIR 1 G Oral Tab TAKE 1 TABLET BY MOUTH EVERY 12 HOURS FOR 1 DAY (Patient not taking: Reported on 10/27/2023) 2 tablet 1    Metoprolol Succinate ER 50 MG Oral Tablet 24 Hr Take 1 tablet (50 mg total) by mouth daily.  (Patient not taking: Reported on 2/10/2023) 90 tablet 1     Allergies:   No Known Allergies    Past Medical History:   Diagnosis Date    Alcohol dependence with withdrawal ([facility] [address] 08/30/2018    Arthritis     Cancer ([facility] [address]     squamous cell right tonsil    Decorative tattoo     Elevated fasting blood sugar 10/08/2018    Essential hypertension     High blood pressure     High triglycerides 10/08/2018    Retinal detachment, right 01/04/2023    Dr. Bhavya Watts    [facility]) 12/10/2019    Type 2 diabetes mellitus without complication, without long-term current use of insulin ([facility] [address] 12/30/2019     Past Surgical History:   Procedure Laterality Date    COLONOSCOPY  12/26/2019    Dr. Shipman Crissy: 5 adenomas, tics, int hemorrhoids - 3 year recall    COLONOSCOPY N/A 5/12/2023    Procedure: COLONOSCOPY;  Surgeon: Rahat Jesus MD;  Location: [facility] ENDOSCOPY    FOCAL LASER - OD - RIGHT EYE Right 01/04/2023    thermal laser photocoagulation for retinal detachment/horseshoe tear superotemporal    HIP REPLACEMENT SURGERY Left 11/2018    LASIK Bilateral ~2005    Dr. José Adame for distance OS for near    RAD RESEC TONSIL/PILLARS  01/05/2018    REMOVAL OF TONSILS,12+ Y/O  01/05/2018    TONSILLECTOMY  01/05/2018    VITRECTOMY,PANRETINAL LASER RX Right 01/17/2023    Dr. Brigitte Joshi History    Socioeconomic History      Marital status:       Spouse name: Not on file      Number of children: Not on file      Years of education: Not on file      Highest education level: Not on file    Occupational History      Not on file    Tobacco Use      Smoking status: Former        Types: Cigars      Smokeless tobacco: Former    Vaping Use      Vaping Use: Not on file    Substance and Sexual Activity      Alcohol use: Not Currently      Drug use: No      Sexual activity: Not Currently        Partners: Female    Other Topics      Concerns:        Grew up on a farm: Not Asked        History of tanning: Not Asked        Outdoor occupation: Not Asked        Reaction to local anesthetic: No        Pt has a pacemaker: No        Pt has a defibrillator: No    Social History Narrative      Not on file    Social Determinants of Health  Financial Resource Strain: Not on file  Food Insecurity: Not on file  Transportation Needs: Not on file  Physical Activity: Not on file  Stress: Not on file  Social Connections: Not on file  Housing Stability: Not on file  Family History   Problem Relation Age of Onset    Cancer Mother         uterine    Diabetes Neg     Glaucoma Neg     Macular degeneration Neg        There were no vitals filed for this visit. HPI:    Patient presents with:  Full Skin Exam: LOV 4/26/23; presents with a spot on left cheek in beard hairline he just noticed; denies bleeding tenderness or flaking. History of extensive sun exposure     No personal or family history of skin cancer loss of sun exposure.      Follow-up AK's  Note several lesions over the face scalp  Numerous nevi   Has been careful with sun protection    Patient with history of throat cancer tonsillar cancer as noted. Has noticed more spots and is outside grew up in [place] lots of sun in the past.  More careful with sun protection currently    Past notes/ records and appropriate/relevant lab results including pathology and past body maps reviewed. Updated and new information noted in current visit. Patient presents with concerns above. Patient has been in their usual state of health. History, medications, allergies reviewed as noted. ROS:  Denies any other systemic complaints. No new or changeing lesions other than noted above. No fevers, chills, night sweats, unusual sun sensitivity. No other skin complaints. History, medications, allergies reviewed as noted. Physical Examination:     Well-developed well-nourished patient alert oriented in no acute distress. Exam total-body performed, including scalp, head, neck, face,nails, hair, external eyes, including conjunctival mucosa, eyelids, lips external ears, back, chest,/ breasts, axillae,  abdomen, arms, legs, palms. Multiple light to medium brown, well marginated, uniformly pigmented, macules and papules 6 mm and less scattered on exam. pigmented lesions examined with dermoscopy benign-appearing patterns. Waxy tannish keratotic papules scattered, cherry-red vascular papules scattered. See map today's date for lesions noted . Otherwise remarkable for lesions as noted on map. See details of examination  See Assessment /Plan for additional history and physical exam also:    Assessment / plan:    No orders of the defined types were placed in this encounter. Meds & Refills for this Visit:  Requested Prescriptions      No prescriptions requested or ordered in this encounter         Inflamed seborrheic keratosis  (primary encounter diagnosis)  Seborrheic keratoses  Lentigo  Benign neoplasm of skin, unspecified location  Multiple nevi  Ak (actinic keratosis)    See details on map.       Remarkable for:    Waxy tan papule at left mid cheek consistent with inflamed seborrheic keratosis trial of cryo. Patient will be going to [facility] for vacation over the holidays. Sunscreen sun protection    Over the trunk, extremities  Waxy tan keratotic papules lesions in areas of concern as noted reassurance given. Benign nature discussed. Possibility of cryo, alphahydroxy acids over-the-counter retinol's discussed. AK's improved at temples, particular cheek. Reassurance given. Continue careful follow-up monitoring. Continue retinol, careful sun protection follow-up in 6 months  Pink-tan scaling papule mid back irritated SK probable pickers nodule at base patient's been feeling this off. Reassurance given trial of cryo. Nevi unchanged continue monitoring. extensive lentigines larger lentigos over the upper arms left in particular. Stable continue monitoring    Numerous small nevi over the lower extremities arms back observe. Benign patterns on dermoscopy    Multiple waxy tan papules over the left lateral cheek zygoma upper back, back shoulders arms consistent with benign keratoses reassurance. No other significant changes. Continue careful sun protection recheck in 4 to 6 months or as needed    Extensive sun damage consider retinol daily for face as tolerated. Careful daily sunscreen use. No suspicious nevi. Recommend follow-up in 4 to 6 months. Patient at risk for additional skin cancers. History of throat cancer lots of sun exposure would suggest HPV vaccine. Will discuss with his PCP. No other susupicious lesions on todays  exam.    Please refer to map for specific lesions. See additional diagnoses. Pros cons of various therapies, risks benefits discussed. Pathophysiology discussed with patient. Therapeutic options reviewed. See  Medications in grid. Instructions reviewed at length. Benign nevi, seborrheic  keratoses, cherry angiomas:  Reassurance regarding other benign skin lesions. Signs and symptoms of skin cancer, ABCDE's of melanoma discussed with patient. Sunscreen use, sun protection, self exams reviewed. Followup as noted RTC routine checkup 6 mos - one year or p.r.n. The patient indicates understanding of these issues and agrees to the plan. The patient is asked to return as noted in follow-up/ above. Encounter Times  Including precharting, reviewing chart, prior notes obtaining history: 5 minutes, medical exam :10 minutes, notes on body map, plan, counseling 10minutes My total time spent caring for the patient on the day of the encounter: 25 minutes       This note was generated using Dragon voice recognition software. Please contact me regarding any confusion resulting from errors in recognition.

## 2023-12-27 ENCOUNTER — OFFICE VISIT (OUTPATIENT)
Dept: INTERNAL MEDICINE CLINIC | Facility: CLINIC | Age: 63
End: 2023-12-27
Payer: COMMERCIAL

## 2023-12-27 ENCOUNTER — LAB ENCOUNTER (OUTPATIENT)
Dept: LAB | Age: 63
End: 2023-12-27
Attending: NURSE PRACTITIONER
Payer: COMMERCIAL

## 2023-12-27 VITALS
SYSTOLIC BLOOD PRESSURE: 157 MMHG | BODY MASS INDEX: 26.32 KG/M2 | DIASTOLIC BLOOD PRESSURE: 86 MMHG | WEIGHT: 188 LBS | OXYGEN SATURATION: 97 % | HEIGHT: 71 IN | HEART RATE: 68 BPM

## 2023-12-27 DIAGNOSIS — Z00.00 ANNUAL PHYSICAL EXAM: Primary | ICD-10-CM

## 2023-12-27 DIAGNOSIS — I10 ESSENTIAL HYPERTENSION: ICD-10-CM

## 2023-12-27 DIAGNOSIS — Z00.00 ANNUAL PHYSICAL EXAM: ICD-10-CM

## 2023-12-27 DIAGNOSIS — E11.9 DIET-CONTROLLED DIABETES MELLITUS (HCC): ICD-10-CM

## 2023-12-27 DIAGNOSIS — Z12.5 PROSTATE CANCER SCREENING: ICD-10-CM

## 2023-12-27 DIAGNOSIS — C09.9 TONSILLAR CANCER (HCC): ICD-10-CM

## 2023-12-27 DIAGNOSIS — Z12.11 COLON CANCER SCREENING: ICD-10-CM

## 2023-12-27 DIAGNOSIS — H25.13 AGE-RELATED NUCLEAR CATARACT OF BOTH EYES: ICD-10-CM

## 2023-12-27 DIAGNOSIS — Z71.85 VACCINE COUNSELING: ICD-10-CM

## 2023-12-27 LAB
ALBUMIN SERPL-MCNC: 4.8 G/DL (ref 3.2–4.8)
ALBUMIN/GLOB SERPL: 1.5 {RATIO} (ref 1–2)
ALP LIVER SERPL-CCNC: 63 U/L
ALT SERPL-CCNC: 31 U/L
ANION GAP SERPL CALC-SCNC: 4 MMOL/L (ref 0–18)
AST SERPL-CCNC: 29 U/L (ref ?–34)
BILIRUB SERPL-MCNC: 0.6 MG/DL (ref 0.2–1.1)
BUN BLD-MCNC: 15 MG/DL (ref 9–23)
BUN/CREAT SERPL: 15.6 (ref 10–20)
CALCIUM BLD-MCNC: 9.6 MG/DL (ref 8.7–10.4)
CHLORIDE SERPL-SCNC: 107 MMOL/L (ref 98–112)
CHOLEST SERPL-MCNC: 163 MG/DL (ref ?–200)
CO2 SERPL-SCNC: 32 MMOL/L (ref 21–32)
COMPLEXED PSA SERPL-MCNC: 2.25 NG/ML (ref ?–4)
CREAT BLD-MCNC: 0.96 MG/DL
CREAT UR-SCNC: 94.6 MG/DL
DEPRECATED RDW RBC AUTO: 41.6 FL (ref 35.1–46.3)
EGFRCR SERPLBLD CKD-EPI 2021: 89 ML/MIN/1.73M2 (ref 60–?)
ERYTHROCYTE [DISTWIDTH] IN BLOOD BY AUTOMATED COUNT: 12.4 % (ref 11–15)
EST. AVERAGE GLUCOSE BLD GHB EST-MCNC: 126 MG/DL (ref 68–126)
FASTING PATIENT LIPID ANSWER: YES
FASTING STATUS PATIENT QL REPORTED: YES
GLOBULIN PLAS-MCNC: 3.2 G/DL (ref 2.8–4.4)
GLUCOSE BLD-MCNC: 119 MG/DL (ref 70–99)
HBA1C MFR BLD: 6 % (ref ?–5.7)
HCT VFR BLD AUTO: 49.5 %
HDLC SERPL-MCNC: 53 MG/DL (ref 40–59)
HGB BLD-MCNC: 16.3 G/DL
LDLC SERPL CALC-MCNC: 96 MG/DL (ref ?–100)
MCH RBC QN AUTO: 30.1 PG (ref 26–34)
MCHC RBC AUTO-ENTMCNC: 32.9 G/DL (ref 31–37)
MCV RBC AUTO: 91.5 FL
MICROALBUMIN UR-MCNC: 2.1 MG/DL
MICROALBUMIN/CREAT 24H UR-RTO: 22.2 UG/MG (ref ?–30)
NONHDLC SERPL-MCNC: 110 MG/DL (ref ?–130)
OSMOLALITY SERPL CALC.SUM OF ELEC: 298 MOSM/KG (ref 275–295)
PLATELET # BLD AUTO: 178 10(3)UL (ref 150–450)
POTASSIUM SERPL-SCNC: 4.4 MMOL/L (ref 3.5–5.1)
PROT SERPL-MCNC: 8 G/DL (ref 5.7–8.2)
RBC # BLD AUTO: 5.41 X10(6)UL
SODIUM SERPL-SCNC: 143 MMOL/L (ref 136–145)
TRIGL SERPL-MCNC: 70 MG/DL (ref 30–149)
TSI SER-ACNC: 3.21 MIU/ML (ref 0.55–4.78)
VLDLC SERPL CALC-MCNC: 11 MG/DL (ref 0–30)
WBC # BLD AUTO: 4.6 X10(3) UL (ref 4–11)

## 2023-12-27 PROCEDURE — 80061 LIPID PANEL: CPT

## 2023-12-27 PROCEDURE — 82570 ASSAY OF URINE CREATININE: CPT

## 2023-12-27 PROCEDURE — 84443 ASSAY THYROID STIM HORMONE: CPT

## 2023-12-27 PROCEDURE — 99396 PREV VISIT EST AGE 40-64: CPT | Performed by: NURSE PRACTITIONER

## 2023-12-27 PROCEDURE — 3077F SYST BP >= 140 MM HG: CPT | Performed by: NURSE PRACTITIONER

## 2023-12-27 PROCEDURE — 3008F BODY MASS INDEX DOCD: CPT | Performed by: NURSE PRACTITIONER

## 2023-12-27 PROCEDURE — 3079F DIAST BP 80-89 MM HG: CPT | Performed by: NURSE PRACTITIONER

## 2023-12-27 PROCEDURE — 3061F NEG MICROALBUMINURIA REV: CPT | Performed by: NURSE PRACTITIONER

## 2023-12-27 PROCEDURE — 85027 COMPLETE CBC AUTOMATED: CPT

## 2023-12-27 PROCEDURE — 82043 UR ALBUMIN QUANTITATIVE: CPT

## 2023-12-27 PROCEDURE — 3044F HG A1C LEVEL LT 7.0%: CPT | Performed by: NURSE PRACTITIONER

## 2023-12-27 PROCEDURE — 80053 COMPREHEN METABOLIC PANEL: CPT

## 2023-12-27 PROCEDURE — 83036 HEMOGLOBIN GLYCOSYLATED A1C: CPT

## 2023-12-27 PROCEDURE — 36415 COLL VENOUS BLD VENIPUNCTURE: CPT

## 2024-04-29 ENCOUNTER — OFFICE VISIT (OUTPATIENT)
Dept: DERMATOLOGY CLINIC | Facility: CLINIC | Age: 64
End: 2024-04-29
Payer: COMMERCIAL

## 2024-04-29 DIAGNOSIS — L82.1 SEBORRHEIC KERATOSES: ICD-10-CM

## 2024-04-29 DIAGNOSIS — D23.9 BENIGN NEOPLASM OF SKIN, UNSPECIFIED LOCATION: ICD-10-CM

## 2024-04-29 DIAGNOSIS — L81.4 LENTIGO: ICD-10-CM

## 2024-04-29 DIAGNOSIS — D22.9 MULTIPLE NEVI: ICD-10-CM

## 2024-04-29 DIAGNOSIS — L57.0 AK (ACTINIC KERATOSIS): Primary | ICD-10-CM

## 2024-04-29 PROCEDURE — 99213 OFFICE O/P EST LOW 20 MIN: CPT | Performed by: DERMATOLOGY

## 2024-04-29 PROCEDURE — 17000 DESTRUCT PREMALG LESION: CPT | Performed by: DERMATOLOGY

## 2024-05-12 NOTE — PROGRESS NOTES
Hadley Crystal is a 63 year old male.  HPI:     CC:    Chief Complaint   Patient presents with    Full Skin Exam     LOV 1024/23. Patient with Hx of AK's, present for FBSE. Denies any concerns at this time. Denies personal or family Hx of skin cancer.          Allergies:  Patient has no known allergies.    HISTORY:    Past Medical History:    Alcohol dependence with withdrawal (HCC)    Arthritis    Cancer (HCC)    squamous cell right tonsil    Decorative tattoo    Diet-controlled diabetes mellitus (HCC)    Elevated fasting blood sugar    Essential hypertension    High blood pressure    High triglycerides    Retinal detachment, right    Dr. Gonzalez    Thrombocytopenia (HCC)    Type 2 diabetes mellitus without complication, without long-term current use of insulin (HCC)      Past Surgical History:   Procedure Laterality Date    Colonoscopy  2019    Dr. Azevedo: 5 adenomas, tics, int hemorrhoids - 3 year recall    Colonoscopy N/A 2023    Procedure: COLONOSCOPY;  Surgeon: Je Pan MD;  Location: [facility] ENDOSCOPY    Focal laser - od - right eye Right 2023    thermal laser photocoagulation for retinal detachment/horseshoe tear superotemporal    Hip replacement surgery Left 2018    Lasik Bilateral ~    Dr. Cardona- monovision- OD for distance OS for near    Rad resec tonsil/pillars  2018    Removal of tonsils,12+ y/o  2018    Tonsillectomy  2018    Vitrectomy,panretinal laser rx Right 2023    Dr. Gonzalez    [place] teeth removed        Family History   Problem Relation Age of Onset    Cancer Mother         uterine    Diabetes Neg     Glaucoma Neg     Macular degeneration Neg       Social History     Socioeconomic History    Marital status:    Tobacco Use    Smoking status: Former     Types: Cigars     Passive exposure: Past    Smokeless tobacco: Former   Substance and Sexual Activity    Alcohol use: Not Currently    Drug use: No    Sexual activity: Not Currently      Partners: Female   Other Topics Concern    History of tanning Yes    Reaction to local anesthetic No    Pt has a pacemaker No    Pt has a defibrillator No        No current outpatient medications on file.     Allergies:   No Known Allergies    Past Medical History:    Alcohol dependence with withdrawal (HCC)    Arthritis    Cancer (HCC)    squamous cell right tonsil    Decorative tattoo    Diet-controlled diabetes mellitus (HCC)    Elevated fasting blood sugar    Essential hypertension    High blood pressure    High triglycerides    Retinal detachment, right    Dr. Gonzalez    Thrombocytopenia (HCC)    Type 2 diabetes mellitus without complication, without long-term current use of insulin (HCC)     Past Surgical History:   Procedure Laterality Date    Colonoscopy  2019    Dr. Azevedo: 5 adenomas, tics, int hemorrhoids - 3 year recall    Colonoscopy N/A 2023    Procedure: COLONOSCOPY;  Surgeon: Je Pan MD;  Location: [facility] ENDOSCOPY    Focal laser - od - right eye Right 2023    thermal laser photocoagulation for retinal detachment/horseshoe tear superotemporal    Hip replacement surgery Left 2018    Lasik Bilateral ~    Dr. Cardona- monovision- OD for distance OS for near    Rad resec tonsil/pillars  2018    Removal of tonsils,12+ y/o  2018    Tonsillectomy  2018    Vitrectomy,panretinal laser rx Right 2023    Dr. Gonzalez    [place] teeth removed       Social History     Socioeconomic History    Marital status:      Spouse name: Not on file    Number of children: Not on file    Years of education: Not on file    Highest education level: Not on file   Occupational History    Not on file   Tobacco Use    Smoking status: Former     Types: Cigars     Passive exposure: Past    Smokeless tobacco: Former   Vaping Use    Vaping status: Not on file   Substance and Sexual Activity    Alcohol use: Not Currently    Drug use: No    Sexual activity: Not Currently      Partners: Female   Other Topics Concern    Grew up on a farm Not Asked    History of tanning Yes    Outdoor occupation Not Asked    Reaction to local anesthetic No    Pt has a pacemaker No    Pt has a defibrillator No   Social History Narrative    Not on file     Social Determinants of Health     Financial Resource Strain: Not on file   Food Insecurity: Not on file   Transportation Needs: Not on file   Physical Activity: Not on file   Stress: Not on file   Social Connections: Not on file   Housing Stability: Not on file     Family History   Problem Relation Age of Onset    Cancer Mother         uterine    Diabetes Neg     Glaucoma Neg     Macular degeneration Neg        There were no vitals filed for this visit.    HPI:    Chief Complaint   Patient presents with    Full Skin Exam     LOV 1024/23. Patient with Hx of AK's, present for FBSE. Denies any concerns at this time. Denies personal or family Hx of skin cancer.      No personal or family history of skin cancer loss of sun exposure.     Follow-up AK's  Note several lesions over the face scalp  Numerous nevi   Has been careful with sun protection    Patient with history of throat cancer tonsillar cancer as noted.  Has noticed more spots and is outside grew up in [place] lots of sun in the past.  More careful with sun protection currently    Past notes/ records and appropriate/relevant lab results including pathology and past body maps reviewed. Updated and new information noted in current visit.       Patient presents with concerns above.    Patient has been in their usual state of health.  History, medications, allergies reviewed as noted.      ROS:  Denies any other systemic complaints.  No new or changeing lesions other than noted above. No fevers, chills, night sweats, unusual sun sensitivity.  No other skin complaints.        History, medications, allergies reviewed as noted.       Physical Examination:     Well-developed well-nourished patient alert oriented  in no acute distress.  Exam total-body performed, including scalp, head, neck, face,nails, hair, external eyes, including conjunctival mucosa, eyelids, lips external ears, back, chest,/ breasts, axillae,  abdomen, arms, legs, palms.     Multiple light to medium brown, well marginated, uniformly pigmented, macules and papules 6 mm and less scattered on exam. pigmented lesions examined with dermoscopy benign-appearing patterns.     Waxy tannish keratotic papules scattered, cherry-red vascular papules scattered.    See map today's date for lesions noted .      Otherwise remarkable for lesions as noted on map.  See details of examination  See Assessment /Plan for additional history and physical exam also:    Assessment / plan:    No orders of the defined types were placed in this encounter.      Meds & Refills for this Visit:  Requested Prescriptions      No prescriptions requested or ordered in this encounter         Encounter Diagnoses   Name Primary?    AK (actinic keratosis) Yes    Lentigo     Seborrheic keratoses     Benign neoplasm of skin, unspecified location     Multiple nevi        See details on map.      Remarkable for:      Erythematous scaling keratotic papules noted at sites noted on map  Actinic Keratoses.  Precancerous nature discussed. Sun protection, sunscreen/ blocks encouraged Lesions treated with cryo- .  Biopsy if not resolved.    Left temple lateral orbitx1    Benign keratoses on face previously improved  Exam otherwise unchanged  Patient traveling again will be in [place].  Encourage sun protection ,sunscreen     Over the trunk, extremities  Waxy tan keratotic papules lesions in areas of concern as noted reassurance given.  Benign nature discussed.  Possibility of cryo, alphahydroxy acids over-the-counter retinol's discussed.      Nevi unchanged continue monitoring.  extensive lentigines larger lentigos over the upper arms left in particular.  Stable continue monitoring    Numerous small nevi over  the lower extremities arms back observe.  Benign patterns on dermoscopy    Multiple waxy tan papules over the left lateral cheek zygoma upper back, back shoulders arms consistent with benign keratoses reassurance.  No other significant changes.  Continue careful sun protection recheck in 4 to 6 months or as needed    Extensive sun damage consider retinol daily for face as tolerated.  Careful daily sunscreen use.    No suspicious nevi.  Recommend follow-up in 4 to 6 months.  Patient at risk for additional skin cancers.    History of throat cancer lots of sun exposure would suggest HPV vaccine.  Will discuss with his PCP.    No other susupicious lesions on todays  exam.    Please refer to map for specific lesions.  See additional diagnoses.  Pros cons of various therapies, risks benefits discussed.Pathophysiology discussed with patient.  Therapeutic options reviewed.  See  Medications in grid.  Instructions reviewed at length.    Benign nevi, seborrheic  keratoses, cherry angiomas:  Reassurance regarding other benign skin lesions.Signs and symptoms of skin cancer, ABCDE's of melanoma discussed with patient. Sunscreen use, sun protection, self exams reviewed.  Followup as noted RTC routine checkup 6 mos - one year or p.r.n.    The patient indicates understanding of these issues and agrees to the plan.  The patient is asked to return as noted in follow-up/ above.    Encounter Times  Including precharting, reviewing chart, prior notes obtaining history: 5 minutes, medical exam :10 minutes, notes on body map, plan, counseling 10minutes My total time spent caring for the patient on the day of the encounter: 25 minutes       This note was generated using Dragon voice recognition software.  Please contact me regarding any confusion resulting from errors in recognition.

## 2024-05-30 ENCOUNTER — MED REC SCAN ONLY (OUTPATIENT)
Dept: INTERNAL MEDICINE CLINIC | Facility: CLINIC | Age: 64
End: 2024-05-30

## 2024-06-03 ENCOUNTER — MED REC SCAN ONLY (OUTPATIENT)
Dept: INTERNAL MEDICINE CLINIC | Facility: CLINIC | Age: 64
End: 2024-06-03

## 2024-06-19 RX ORDER — VALACYCLOVIR HYDROCHLORIDE 1 G/1
TABLET, FILM COATED ORAL
Qty: 2 TABLET | Refills: 1 | Status: SHIPPED | OUTPATIENT
Start: 2024-06-19

## 2024-06-19 NOTE — TELEPHONE ENCOUNTER
Refill passed per [facility] protocol.    Requested Prescriptions   Pending Prescriptions Disp Refills    VALACYCLOVIR 1 G Oral Tab [Pharmacy Med Name: VALACYCLOVIR 1GM TABLETS] 2 tablet 1     Sig: TAKE 1 TABLET BY MOUTH EVERY 12 HOURS FOR 1 DAY       Herpes Agent Protocol Passed - 6/17/2024 10:43 AM        Passed - In person appointment or virtual visit in the past 12 mos or appointment in next 3 mos     Recent Outpatient Visits              1 month ago AK (actinic keratosis)    [facility] Brandy Ruiz MD    Office Visit    5 months ago Annual physical exam    [facility] Yamileth Ladd APRN    Office Visit    7 months ago [facility]bor[facility]heic keratosis    [facility]    [facility] Visit    1 year ago Seborrheic keratoses    [facility] Brandy Ruiz MD    Office Visit    1 year ago Personal history of colonic polyps    [facility]    Nurse Only          Future Appointments         Provider Department Appt Notes    In 4 months Brandy Ruiz MD [facility] 6 mos follow up

## 2024-10-30 ENCOUNTER — OFFICE VISIT (OUTPATIENT)
Dept: DERMATOLOGY CLINIC | Facility: CLINIC | Age: 64
End: 2024-10-30
Payer: COMMERCIAL

## 2024-10-30 DIAGNOSIS — L82.0 INFLAMED SEBORRHEIC KERATOSIS: ICD-10-CM

## 2024-10-30 DIAGNOSIS — L57.0 AK (ACTINIC KERATOSIS): Primary | ICD-10-CM

## 2024-10-30 DIAGNOSIS — L82.1 SEBORRHEIC KERATOSES: ICD-10-CM

## 2024-10-30 DIAGNOSIS — D22.9 MULTIPLE NEVI: ICD-10-CM

## 2024-10-30 DIAGNOSIS — D23.9 BENIGN NEOPLASM OF SKIN, UNSPECIFIED LOCATION: ICD-10-CM

## 2024-10-30 DIAGNOSIS — L81.4 LENTIGO: ICD-10-CM

## 2024-10-30 PROCEDURE — G2211 COMPLEX E/M VISIT ADD ON: HCPCS | Performed by: DERMATOLOGY

## 2024-10-30 PROCEDURE — 99213 OFFICE O/P EST LOW 20 MIN: CPT | Performed by: DERMATOLOGY

## 2024-10-30 RX ORDER — MOXIFLOXACIN 5 MG/ML
SOLUTION/ DROPS OPHTHALMIC
COMMUNITY
Start: 2024-08-21

## 2024-10-30 RX ORDER — FLUOROMETHOLONE 1 MG/ML
1 SUSPENSION/ DROPS OPHTHALMIC 4 TIMES DAILY
COMMUNITY
Start: 2024-10-10

## 2024-11-10 NOTE — PROGRESS NOTES
Hadley Crystal is a 64 year old male.  HPI:     CC:    Chief Complaint   Patient presents with    Actinic Keratosis     LOV 2024: Pt present for an AK follow up, pt reports a few area of concern in the leg abdomen and head with varied timeline. HX of SCC in throat, but no hx of skin cancer         Allergies:  Patient has no known allergies.    HISTORY:    Past Medical History:    Alcohol dependence with withdrawal (HCC)    Arthritis    Cancer (HCC)    squamous cell right tonsil    Decorative tattoo    Diet-controlled diabetes mellitus (HCC)    Elevated fasting blood sugar    Essential hypertension    High blood pressure    High triglycerides    Retinal detachment, right    Dr. Gonzalez    Thrombocytopenia (HCC)    Type 2 diabetes mellitus without complication, without long-term current use of insulin (HCC)      Past Surgical History:   Procedure Laterality Date    Colonoscopy  2019    Dr. Azevedo: 5 adenomas, tics, int hemorrhoids - 3 year recall    Colonoscopy N/A 2023    Procedure: COLONOSCOPY;  Surgeon: Je Pan MD;  Location: [facility] ENDOSCOPY    Focal laser - od - right eye Right 2023    thermal laser photocoagulation for retinal detachment/horseshoe tear superotemporal    Hip replacement surgery Left 2018    Lasik Bilateral ~    Dr. Cardona- monovision- OD for distance OS for near    Rad resec tonsil/pillars  2018    Removal of tonsils,13+ y/o  2018    Tonsillectomy  2018    Vitrectomy,panretinal laser rx Right 2023    Dr. Gonzalez    [place] teeth removed        Family History   Problem Relation Age of Onset    Cancer Mother         uterine    Diabetes Neg     Glaucoma Neg     Macular degeneration Neg       Social History     Socioeconomic History    Marital status:    Tobacco Use    Smoking status: Former     Types: Cigars     Passive exposure: Past    Smokeless tobacco: Former   Substance and Sexual Activity    Alcohol use: Not Currently     Drug use: No    Sexual activity: Not Currently     Partners: Female   Other Topics Concern    History of tanning Yes    Reaction to local anesthetic No    Pt has a pacemaker No    Pt has a defibrillator No        Current Outpatient Medications   Medication Sig Dispense Refill    fluorometholone 0.1 % Ophthalmic Suspension Place 1 drop into both eyes 4 (four) times daily.      moxifloxacin 0.5 % Ophthalmic Solution INSTILL 1 DROP IN BOTH EYES FOUR TIMES DAILY AS DIRECTED      VALACYCLOVIR 1 G Oral Tab TAKE 1 TABLET BY MOUTH EVERY 12 HOURS FOR 1 DAY 2 tablet 1     Allergies:   No Known Allergies    Past Medical History:    Alcohol dependence with withdrawal (HCC)    Arthritis    Cancer (HCC)    squamous cell right tonsil    Decorative tattoo    Diet-controlled diabetes mellitus (HCC)    Elevated fasting blood sugar    Essential hypertension    High blood pressure    High triglycerides    Retinal detachment, right    Dr. Gonzalez    Thrombocytopenia (HCC)    Type 2 diabetes mellitus without complication, without long-term current use of insulin (HCC)     Past Surgical History:   Procedure Laterality Date    Colonoscopy  2019    Dr. Azevedo: 5 adenomas, tics, int hemorrhoids - 3 year recall    Colonoscopy N/A 2023    Procedure: COLONOSCOPY;  Surgeon: Je Pan MD;  Location: [facility] ENDOSCOPY    Focal laser - od - right eye Right 2023    thermal laser photocoagulation for retinal detachment/horseshoe tear superotemporal    Hip replacement surgery Left 2018    Lasik Bilateral ~    Dr. Cardona- monovision- OD for distance OS for near    Rad resec tonsil/pillars  2018    Removal of tonsils,13+ y/o  2018    Tonsillectomy  2018    Vitrectomy,panretinal laser rx Right 2023    Dr. Gonzalez    [place] teeth removed       Social History     Socioeconomic History    Marital status:      Spouse name: Not on file    Number of children: Not on file    Years of education: Not on  file    Highest education level: Not on file   Occupational History    Not on file   Tobacco Use    Smoking status: Former     Types: Cigars     Passive exposure: Past    Smokeless tobacco: Former   Vaping Use    Vaping status: Not on file   Substance and Sexual Activity    Alcohol use: Not Currently    Drug use: No    Sexual activity: Not Currently     Partners: Female   Other Topics Concern    Grew up on a farm Not Asked    History of tanning Yes    Outdoor occupation Not Asked    Reaction to local anesthetic No    Pt has a pacemaker No    Pt has a defibrillator No   Social History Narrative    Not on file     Social Drivers of Health     Financial Resource Strain: Not on file   Food Insecurity: Not on file   Transportation Needs: Not on file   Physical Activity: Not on file   Stress: Not on file   Social Connections: Not on file   Housing Stability: Not on file     Family History   Problem Relation Age of Onset    Cancer Mother         uterine    Diabetes Neg     Glaucoma Neg     Macular degeneration Neg        There were no vitals filed for this visit.    HPI:    Chief Complaint   Patient presents with    Actinic Keratosis     LOV 04/29/2024: Pt present for an AK follow up, pt reports a few area of concern in the leg abdomen and head with varied timeline. HX of SCC in throat, but no hx of skin cancer     No personal or family history of skin cancer loss of sun exposure.     Follow-up AK's  Note several lesions over the face scalp  Numerous nevi   Has been careful with sun protection    Patient with history of throat cancer tonsillar cancer as noted.  Has noticed more spots and is outside grew up in [place] lots of sun in the past.  More careful with sun protection currently    Past notes/ records and appropriate/relevant lab results including pathology and past body maps reviewed. Updated and new information noted in current visit.       Patient presents with concerns above.    Patient has been in their usual  state of health.  History, medications, allergies reviewed as noted.      ROS:  Denies any other systemic complaints.  No new or changeing lesions other than noted above. No fevers, chills, night sweats, unusual sun sensitivity.  No other skin complaints.        History, medications, allergies reviewed as noted.       Physical Examination:     Well-developed well-nourished patient alert oriented in no acute distress.  Exam total-body performed, including scalp, head, neck, face,nails, hair, external eyes, including conjunctival mucosa, eyelids, lips external ears, back, chest,/ breasts, axillae,  abdomen, arms, legs, palms.     Multiple light to medium brown, well marginated, uniformly pigmented, macules and papules 6 mm and less scattered on exam. pigmented lesions examined with dermoscopy benign-appearing patterns.     Waxy tannish keratotic papules scattered, cherry-red vascular papules scattered.    See map today's date for lesions noted .      Otherwise remarkable for lesions as noted on map.  See details of examination  See Assessment /Plan for additional history and physical exam also:    Assessment / plan:    No orders of the defined types were placed in this encounter.      Meds & Refills for this Visit:  Requested Prescriptions      No prescriptions requested or ordered in this encounter         Encounter Diagnoses   Name Primary?    AK (actinic keratosis) Yes    Lentigo     Seborrheic keratoses     Benign neoplasm of skin, unspecified location     Multiple nevi     Inflamed seborrheic keratosis        See details on map.      Remarkable for:    Patient seen for follow-up long-term monitoring, treatment of  Actinic keratoses sun damage  Plan of care:  ongoing surveillance, monitoring including regular follow-up due to longer term risk of recurrence, new lesions.  See previous notes.  There is a longitudinal care relationship with me, the care plan reflects the ongoing nature of the continuous relationship  of care, and the medical record indicates that there is ongoing treatment of a serious/complex medical condition which I am currently managing.  is Applicable      Actinic Keratoses.  Precancerous nature discussed. Sun protection, sunscreen/ blocks encouraged .  Monitoring for new lesions.  Sun damage additional recurrent and new actinic keratoses, skin cancers may occur in areas of prior actinic keratoses, related to past sun exposure to minimize current sun exposure.  Sunscreen applied consistently regularly, reapplication and sun protection while driving recommended.    Lentigines at temples over-the-counter retinol.    SK left arm monitor  Otherwise no significant changes in exam    Benign keratoses on face previously improved    Patient traveling again was in [place].  Encourage sun protection ,sunscreen     Over the trunk, extremities  Waxy tan keratotic papules lesions in areas of concern as noted reassurance given.  Benign nature discussed.  Possibility of cryo, alphahydroxy acids over-the-counter retinol's discussed.      Nevi unchanged continue monitoring.  extensive lentigines larger lentigos over the upper arms left in particular.  Stable continue monitoring    Numerous small nevi over the lower extremities arms back observe.  Benign patterns on dermoscopy    Multiple waxy tan papules over the left lateral cheek zygoma upper back, back shoulders arms consistent with benign keratoses reassurance.  No other significant changes.  Continue careful sun protection recheck in 4 to 6 months or as needed    Extensive sun damage consider retinol daily for face as tolerated.  Careful daily sunscreen use.    No suspicious nevi.  Recommend follow-up in 4 to 6 months.  Patient at risk for additional skin cancers.    History of throat cancer lots of sun exposure would suggest HPV vaccine.  Will discuss with his PCP.    No other susupicious lesions on todays  exam.    Please refer to map for specific lesions.  See  additional diagnoses.  Pros cons of various therapies, risks benefits discussed.Pathophysiology discussed with patient.  Therapeutic options reviewed.  See  Medications in grid.  Instructions reviewed at length.    Benign nevi, seborrheic  keratoses, cherry angiomas:  Reassurance regarding other benign skin lesions.Signs and symptoms of skin cancer, ABCDE's of melanoma discussed with patient. Sunscreen use, sun protection, self exams reviewed.  Followup as noted RTC routine checkup 6 mos - one year or p.r.n.    The patient indicates understanding of these issues and agrees to the plan.  The patient is asked to return as noted in follow-up/ above.    Encounter Times  Including precharting, reviewing chart, prior notes obtaining history: 5 minutes, medical exam :10 minutes, notes on body map, plan, counseling 10minutes My total time spent caring for the patient on the day of the encounter: 25 minutes       This note was generated using Dragon voice recognition software.  Please contact me regarding any confusion resulting from errors in recognition.

## 2024-12-17 PROBLEM — C09.9 TONSILLAR CANCER (HCC): Status: RESOLVED | Noted: 2018-08-30 | Resolved: 2024-12-17

## 2024-12-17 PROBLEM — Z87.39 HISTORY OF OSTEONECROSIS: Status: ACTIVE | Noted: 2024-12-17

## 2024-12-17 PROBLEM — M87.9 OSTEONECROSIS OF LEFT HIP (HCC): Status: RESOLVED | Noted: 2018-08-30 | Resolved: 2024-12-17

## 2024-12-17 PROBLEM — Z85.818 HISTORY OF CANCER TONSIL: Status: ACTIVE | Noted: 2024-12-17

## 2024-12-30 ENCOUNTER — LAB ENCOUNTER (OUTPATIENT)
Dept: LAB | Age: 64
End: 2024-12-30
Attending: INTERNAL MEDICINE
Payer: COMMERCIAL

## 2024-12-30 ENCOUNTER — OFFICE VISIT (OUTPATIENT)
Dept: INTERNAL MEDICINE CLINIC | Facility: CLINIC | Age: 64
End: 2024-12-30
Payer: COMMERCIAL

## 2024-12-30 VITALS
SYSTOLIC BLOOD PRESSURE: 144 MMHG | BODY MASS INDEX: 26.6 KG/M2 | HEIGHT: 71 IN | WEIGHT: 190 LBS | HEART RATE: 76 BPM | DIASTOLIC BLOOD PRESSURE: 97 MMHG | RESPIRATION RATE: 16 BRPM

## 2024-12-30 DIAGNOSIS — C09.9 TONSILLAR CANCER (HCC): ICD-10-CM

## 2024-12-30 DIAGNOSIS — Z12.5 PROSTATE CANCER SCREENING: ICD-10-CM

## 2024-12-30 DIAGNOSIS — Z00.00 ANNUAL PHYSICAL EXAM: ICD-10-CM

## 2024-12-30 DIAGNOSIS — E11.9 DIET-CONTROLLED DIABETES MELLITUS (HCC): ICD-10-CM

## 2024-12-30 DIAGNOSIS — Z12.11 COLON CANCER SCREENING: ICD-10-CM

## 2024-12-30 DIAGNOSIS — I10 ESSENTIAL HYPERTENSION: ICD-10-CM

## 2024-12-30 DIAGNOSIS — Z00.00 ANNUAL PHYSICAL EXAM: Primary | ICD-10-CM

## 2024-12-30 LAB
ALBUMIN SERPL-MCNC: 5 G/DL (ref 3.2–4.8)
ALBUMIN/GLOB SERPL: 2.1 {RATIO} (ref 1–2)
ALP LIVER SERPL-CCNC: 53 U/L
ALT SERPL-CCNC: 30 U/L
ANION GAP SERPL CALC-SCNC: 6 MMOL/L (ref 0–18)
AST SERPL-CCNC: 28 U/L (ref ?–34)
BASOPHILS # BLD AUTO: 0.03 X10(3) UL (ref 0–0.2)
BASOPHILS NFR BLD AUTO: 0.6 %
BILIRUB SERPL-MCNC: 0.6 MG/DL (ref 0.2–1.1)
BUN BLD-MCNC: 13 MG/DL (ref 9–23)
BUN/CREAT SERPL: 11.7 (ref 10–20)
CALCIUM BLD-MCNC: 9.8 MG/DL (ref 8.7–10.4)
CHLORIDE SERPL-SCNC: 108 MMOL/L (ref 98–112)
CHOLEST SERPL-MCNC: 171 MG/DL (ref ?–200)
CO2 SERPL-SCNC: 29 MMOL/L (ref 21–32)
COMPLEXED PSA SERPL-MCNC: 2.11 NG/ML (ref ?–4)
CREAT BLD-MCNC: 1.11 MG/DL
CREAT UR-SCNC: 216.1 MG/DL
DEPRECATED RDW RBC AUTO: 40.1 FL (ref 35.1–46.3)
EGFRCR SERPLBLD CKD-EPI 2021: 75 ML/MIN/1.73M2 (ref 60–?)
EOSINOPHIL # BLD AUTO: 0.12 X10(3) UL (ref 0–0.7)
EOSINOPHIL NFR BLD AUTO: 2.3 %
ERYTHROCYTE [DISTWIDTH] IN BLOOD BY AUTOMATED COUNT: 12.3 % (ref 11–15)
EST. AVERAGE GLUCOSE BLD GHB EST-MCNC: 123 MG/DL (ref 68–126)
FASTING PATIENT LIPID ANSWER: YES
FASTING STATUS PATIENT QL REPORTED: YES
GLOBULIN PLAS-MCNC: 2.4 G/DL (ref 2–3.5)
GLUCOSE BLD-MCNC: 107 MG/DL (ref 70–99)
HBA1C MFR BLD: 5.9 % (ref ?–5.7)
HCT VFR BLD AUTO: 46.2 %
HDLC SERPL-MCNC: 45 MG/DL (ref 40–59)
HGB BLD-MCNC: 16.4 G/DL
IMM GRANULOCYTES # BLD AUTO: 0.01 X10(3) UL (ref 0–1)
IMM GRANULOCYTES NFR BLD: 0.2 %
LDLC SERPL CALC-MCNC: 107 MG/DL (ref ?–100)
LYMPHOCYTES # BLD AUTO: 1.13 X10(3) UL (ref 1–4)
LYMPHOCYTES NFR BLD AUTO: 21.4 %
MCH RBC QN AUTO: 31.6 PG (ref 26–34)
MCHC RBC AUTO-ENTMCNC: 35.5 G/DL (ref 31–37)
MCV RBC AUTO: 89 FL
MICROALBUMIN UR-MCNC: 3.4 MG/DL
MICROALBUMIN/CREAT 24H UR-RTO: 15.7 UG/MG (ref ?–30)
MONOCYTES # BLD AUTO: 0.49 X10(3) UL (ref 0.1–1)
MONOCYTES NFR BLD AUTO: 9.3 %
NEUTROPHILS # BLD AUTO: 3.5 X10 (3) UL (ref 1.5–7.7)
NEUTROPHILS # BLD AUTO: 3.5 X10(3) UL (ref 1.5–7.7)
NEUTROPHILS NFR BLD AUTO: 66.2 %
NONHDLC SERPL-MCNC: 126 MG/DL (ref ?–130)
OSMOLALITY SERPL CALC.SUM OF ELEC: 297 MOSM/KG (ref 275–295)
PLATELET # BLD AUTO: 193 10(3)UL (ref 150–450)
POTASSIUM SERPL-SCNC: 4.5 MMOL/L (ref 3.5–5.1)
PROT SERPL-MCNC: 7.4 G/DL (ref 5.7–8.2)
RBC # BLD AUTO: 5.19 X10(6)UL
SODIUM SERPL-SCNC: 143 MMOL/L (ref 136–145)
TRIGL SERPL-MCNC: 101 MG/DL (ref 30–149)
TSI SER-ACNC: 1.86 UIU/ML (ref 0.55–4.78)
VLDLC SERPL CALC-MCNC: 17 MG/DL (ref 0–30)
WBC # BLD AUTO: 5.3 X10(3) UL (ref 4–11)

## 2024-12-30 PROCEDURE — 85025 COMPLETE CBC W/AUTO DIFF WBC: CPT

## 2024-12-30 PROCEDURE — 80061 LIPID PANEL: CPT

## 2024-12-30 PROCEDURE — 80053 COMPREHEN METABOLIC PANEL: CPT

## 2024-12-30 PROCEDURE — 82570 ASSAY OF URINE CREATININE: CPT

## 2024-12-30 PROCEDURE — 36415 COLL VENOUS BLD VENIPUNCTURE: CPT

## 2024-12-30 PROCEDURE — 82043 UR ALBUMIN QUANTITATIVE: CPT

## 2024-12-30 PROCEDURE — 83036 HEMOGLOBIN GLYCOSYLATED A1C: CPT

## 2024-12-30 PROCEDURE — 84443 ASSAY THYROID STIM HORMONE: CPT

## 2024-12-30 NOTE — PROGRESS NOTES
Hadley Crystal is a 64 year old male.  Chief Complaint   Patient presents with    Physical     HPI:   He presents for his annual physical exam.   He is checking his BP at home. His last reading was 128/90. He was prescribed metoprolol previously he does not take it consistently. He does not like the way the medication makes him feel. It causes fatigue.     He have left cataract surgery on 2024.   He had right cataract surgery 2023.     He does check his blood sugar occasionally. His latest reading 118. He is trying to cut down his sweets.     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     Current Outpatient Medications   Medication Sig Dispense Refill    fluorometholone 0.1 % Ophthalmic Suspension Place 1 drop into both eyes 4 (four) times daily. (Patient not taking: Reported on 2024)      moxifloxacin 0.5 % Ophthalmic Solution INSTILL 1 DROP IN BOTH EYES FOUR TIMES DAILY AS DIRECTED (Patient not taking: Reported on 2024)      VALACYCLOVIR 1 G Oral Tab TAKE 1 TABLET BY MOUTH EVERY 12 HOURS FOR 1 DAY (Patient not taking: Reported on 2024) 2 tablet 1      Past Medical History:    Alcohol dependence with withdrawal (HCC)    Arthritis    Cancer (HCC)    squamous cell right tonsil    Decorative tattoo    Diet-controlled diabetes mellitus (HCC)    Elevated fasting blood sugar    Essential hypertension    High blood pressure    High triglycerides    Retinal detachment, right    Dr. Gonzalez    Thrombocytopenia (HCC)    Type 2 diabetes mellitus without complication, without long-term current use of insulin (HCC)      Past Surgical History:   Procedure Laterality Date    Colonoscopy  2019    Dr. Azevedo: 5 adenomas, tics, int hemorrhoids - 3 year recall    Colonoscopy N/A 2023    Procedure: COLONOSCOPY;  Surgeon: Je Pan MD;  Location: [facility] ENDOSCOPY    Focal laser - od - right eye Right 2023    thermal laser photocoagulation for retinal detachment/horseshoe tear superotemporal     Hip replacement surgery Left 11/2018    Lasik Bilateral ~2005    Dr. Cardona- monovision- OD for distance OS for near    Rad resec tonsil/pillars  01/05/2018    Removal of tonsils,12+ y/o  01/05/2018    Tonsillectomy  01/05/2018    Vitrectomy,panretinal laser rx Right 01/17/2023    Dr. Gonazlez    [place] teeth removed        Social History:  Social History     Socioeconomic History    Marital status:    Tobacco Use    Smoking status: Former     Types: Cigars     Passive exposure: Past    Smokeless tobacco: Former   Substance and Sexual Activity    Alcohol use: Not Currently    Drug use: No    Sexual activity: Not Currently     Partners: Female   Other Topics Concern    History of tanning Yes    Reaction to local anesthetic No    Pt has a pacemaker No    Pt has a defibrillator No      Family History   Problem Relation Age of Onset    Cancer Mother         uterine    Diabetes Neg     Glaucoma Neg     Macular degeneration Neg       Allergies[1]     REVIEW OF SYSTEMS:     Review of Systems   Constitutional:  Negative for fever.   HENT: Negative.     Respiratory:  Negative for cough, shortness of breath and wheezing.    Cardiovascular:  Negative for chest pain.   Gastrointestinal: Negative.    Genitourinary: Negative.    Musculoskeletal: Negative.    Skin: Negative.    Neurological: Negative.    Psychiatric/Behavioral: Negative.        Wt Readings from Last 5 Encounters:   12/30/24 190 lb (86.2 kg)   02/13/24 188 lb (85.3 kg)   12/27/23 188 lb (85.3 kg)   12/06/23 185 lb (83.9 kg)   05/05/23 190 lb (86.2 kg)     Body mass index is 26.5 kg/m².      EXAM:   BP (!) 144/97 (BP Location: Right arm, Patient Position: Sitting, Cuff Size: large)   Pulse 76   Resp 16   Ht 5' 11\" (1.803 m)   Wt 190 lb (86.2 kg)   BMI 26.50 kg/m²     Physical Exam  Vitals reviewed.   Constitutional:       Appearance: Normal appearance.   HENT:      Head: Normocephalic.      Right Ear: Tympanic membrane normal.      Left Ear: Tympanic  membrane normal.      Nose: No congestion.   Eyes:      Extraocular Movements: Extraocular movements intact.      Pupils: Pupils are equal, round, and reactive to light.   Cardiovascular:      Rate and Rhythm: Normal rate and regular rhythm.      Pulses: Normal pulses.   Pulmonary:      Breath sounds: Normal breath sounds. No wheezing.   Abdominal:      General: Bowel sounds are normal.      Palpations: Abdomen is soft.      Tenderness: There is no abdominal tenderness.   Musculoskeletal:         General: No swelling. Normal range of motion.      Cervical back: Normal range of motion and neck supple.   Skin:     General: Skin is warm and dry.   Neurological:      Mental Status: He is alert and oriented to person, place, and time.   Psychiatric:         Mood and Affect: Mood normal.         Behavior: Behavior normal.            ASSESSMENT AND PLAN:   1. Annual physical exam  - CBC With Differential With Platelet; Future  - Comp Metabolic Panel (14); Future  - Lipid Panel [E]; Future  - Hemoglobin A1C [E]; Future  - TSH W Reflex To Free T4 [E]; Future    2. Tonsillar cancer (HCC)  - per patient he has not followed up recently  - last visit 11/27/2019 with Dr Worthington   - he does not have any symptoms of dysphagia or voice change   - ENT Referral - [facility])    3. Colon cancer screening  - colonoscopy 5/12/2023  - repeat 5/12/2026     4. Diet-controlled diabetes mellitus (HCC)  - Hemoglobin A1C [E]; Future  - Microalb/Creat Ratio, Random Urine [E]; Future    5. Essential hypertension  - monitor BP at home and send a Prelertt message with results.     6. Prostate cancer screening  - PSA (Screening) [E]; Future      The patient indicates understanding of these issues and agrees to the plan.  Return in about 1 year (around 12/30/2025).       [1] No Known Allergies

## 2025-01-27 RX ORDER — VALACYCLOVIR HYDROCHLORIDE 1 G/1
1000 TABLET, FILM COATED ORAL EVERY 12 HOURS SCHEDULED
Qty: 4 TABLET | Refills: 5 | Status: SHIPPED | OUTPATIENT
Start: 2025-01-27

## 2025-01-27 NOTE — TELEPHONE ENCOUNTER
Refill passed per [facility] protocols.    Requested Prescriptions   Pending Prescriptions Disp Refills    VALACYCLOVIR 1 G Oral Tab [Pharmacy Med Name: VALACYCLOVIR 1GM TABLETS] 4 tablet 5     Sig: TAKE 1 TABLET BY MOUTH EVERY 12 HOURS FOR 1 DAY       Herpes Agent Protocol Passed - 1/27/2025  5:17 PM        Passed - In person appointment or virtual visit in the past 12 mos or appointment in next 3 mos     Recent Outpatient Visits              4 weeks ago [facility]    [facility]    2 [facility] AK [facility]inic keratosis[facility]    [facility]    9 months ago AK ([facility]inic keratosis)    [facility]    [facility]    1 year ago [facility]    Office Visit    1 year ago Inflamed seborrheic keratosis    [facility]    [facility]          Future Appointments         Provider Department Appt Notes    In 3 weeks Marta Molina MD [facility] annual routine exam    In 3 months Brandy Ruiz MD [facility] 6 mon f/u                    Passed - Medication is active on med list

## 2025-02-20 ENCOUNTER — OFFICE VISIT (OUTPATIENT)
Dept: OTOLARYNGOLOGY | Facility: CLINIC | Age: 65
End: 2025-02-20
Payer: COMMERCIAL

## 2025-02-20 VITALS — WEIGHT: 190 LBS | BODY MASS INDEX: 26.6 KG/M2 | HEIGHT: 71 IN

## 2025-02-20 DIAGNOSIS — Z85.818 HISTORY OF CANCER TONSIL: Primary | ICD-10-CM

## 2025-02-20 PROCEDURE — 99203 OFFICE O/P NEW LOW 30 MIN: CPT | Performed by: SPECIALIST

## 2025-02-20 PROCEDURE — 3008F BODY MASS INDEX DOCD: CPT | Performed by: SPECIALIST

## 2025-02-20 RX ORDER — GLUCOSAM/CHON-MSM1/C/MANG/BOSW 500-416.6
TABLET ORAL DAILY
COMMUNITY
Start: 2025-01-02

## 2025-02-21 NOTE — PATIENT INSTRUCTIONS
No evidence of recurrent or metastatic cancer.  We also reviewed your TSH which was within normal range.  Please be sure to get checked for your tonsil cancer as well as get blood work for your thyroid on a yearly basis.  Follow-up in 1 years time, sooner if problems.

## 2025-02-21 NOTE — PROGRESS NOTES
Hadley Crystal is a 64 year old male.   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  He is status postsurgery and radiation therapy on 3/19/2018.  Last visit was 3/19/2018.  No complaints.      Current Outpatient Medications   Medication Sig Dispense Refill    TRUEplus Lancets 33G Does not apply Misc by In Vitro route daily.      valACYclovir 1 G Oral Tab Take 1 tablet (1,000 mg total) by mouth every 12 (twelve) hours. 4 tablet 5    fluorometholone 0.1 % Ophthalmic Suspension Place 1 drop into both eyes 4 (four) times daily. (Patient not taking: Reported on 2/20/2025)      moxifloxacin 0.5 % Ophthalmic Solution INSTILL 1 DROP IN BOTH EYES FOUR TIMES DAILY AS DIRECTED (Patient not taking: Reported on 2/20/2025)        Past Medical History:    Alcohol dependence with withdrawal (HCC)    Arthritis    Cancer (HCC)    squamous cell right tonsil    Decorative tattoo    Diet-controlled diabetes mellitus (HCC)    Elevated fasting blood sugar    Essential hypertension    High blood pressure    High triglycerides    Retinal detachment, right    Dr. Gonzalez    Thrombocytopenia    Type 2 diabetes mellitus without complication, without long-term current use of insulin (HCC)      Social History:  Social History     Socioeconomic History    Marital status:    Tobacco Use    Smoking status: Former     Types: Cigars     Passive exposure: Past    Smokeless tobacco: Former   Vaping Use    Vaping status: Never Used   Substance and Sexual Activity    Alcohol use: Not Currently    Drug use: No    Sexual activity: Not Currently     Partners: Female   Other Topics Concern    History of tanning Yes    Reaction to local anesthetic No    Pt has a pacemaker No    Pt has a defibrillator No        REVIEW OF SYSTEMS:   GENERAL HEALTH: feels well otherwise  GENERAL : denies fever, chills,  sweats, weight loss, weight gain  SKIN: denies any unusual skin lesions or rashes  RESPIRATORY: denies shortness of breath with exertion  NEURO: denies headaches    EXAM:   Ht 5' 11\" (1.803 m)   Wt 190 lb (86.2 kg)   BMI 26.50 kg/m²   System Details   Skin Inspection - Normal.   Constitutional Overall appearance - Normal.   Head/Face Facial features - Normal. Eyebrows - Normal. Skull - Normal.   Eyes Conjunctiva - Right: Normal, Left: Normal. Pupil - Right: Normal, Left: Normal.    Ears Inspection - Right: Normal, Left: Normal.   Canal - Right: Normal, Left: Normal.    TM - Right: Normal, Left: Normal.   Nasal External nose - Normal.   Nasal septum - Normal.  Turbinates - Normal   Oral/Oropharynx Lips - Normal, Tonsils -defect in the right tonsillar pillar no evidence of residual tumor tongue -base of tongue also negative for abnormal mass   Neck Exam Inspection - Normal. Palpation - Normal. Parotid gland - Normal. Thyroid gland - Normal.   Lymph Detail Submental. Submandibular. Anterior cervical. Posterior cervical. Supraclavicular.  All without enlargement   Psychiatric Orientation - Oriented to time, place, person & situation. Appropriate mood and affect.   Neurological Memory - Normal. Cranial nerves - Cranial nerves II through XII grossly intact.   Larynx Mirror examination normal vocal cord movement and posterior larynx.  Cannot visualize the anterior larynx by mirror examination     ASSESSMENT AND PLAN:   1. History of cancer tonsil  No evidence of recurrent or metastatic disease.  TSH tested 12/30/2024 and normal at 1.860.  Follow-up in 1 years time, sooner if problems.  Repeat TSH yearly.      The patient indicates understanding of these issues and agrees to the plan.      Marta Molina MD  2/20/2025  6:02 PM

## 2025-06-30 ENCOUNTER — OFFICE VISIT (OUTPATIENT)
Dept: DERMATOLOGY CLINIC | Facility: CLINIC | Age: 65
End: 2025-06-30
Payer: COMMERCIAL

## 2025-06-30 DIAGNOSIS — L82.1 SEBORRHEIC KERATOSES: ICD-10-CM

## 2025-06-30 DIAGNOSIS — L57.0 AK (ACTINIC KERATOSIS): Primary | ICD-10-CM

## 2025-06-30 DIAGNOSIS — D22.9 MULTIPLE NEVI: ICD-10-CM

## 2025-06-30 DIAGNOSIS — D23.9 BENIGN NEOPLASM OF SKIN, UNSPECIFIED LOCATION: ICD-10-CM

## 2025-06-30 DIAGNOSIS — L81.4 LENTIGO: ICD-10-CM

## 2025-06-30 PROCEDURE — 99213 OFFICE O/P EST LOW 20 MIN: CPT | Performed by: DERMATOLOGY

## 2025-06-30 PROCEDURE — 17000 DESTRUCT PREMALG LESION: CPT | Performed by: DERMATOLOGY

## 2025-06-30 NOTE — PROGRESS NOTES
The following individual(s) verbally consented to be recorded using ambient AI listening technology and understand that they can each withdraw their consent to this listening technology at any point by asking the clinician to turn off or pause the recording:    Patient name: Hadley Crystal  Additional names:  N/A

## 2025-07-01 RX ORDER — TRETINOIN 0.5 MG/G
CREAM TOPICAL
Qty: 20 G | Refills: 3 | Status: SHIPPED | OUTPATIENT
Start: 2025-07-01

## 2025-07-01 RX ORDER — TRETINOIN 0.5 MG/G
CREAM TOPICAL
Qty: 20 G | Refills: 3 | Status: SHIPPED | OUTPATIENT
Start: 2025-07-01 | End: 2025-07-01

## 2025-07-07 NOTE — PROGRESS NOTES
Hadley Crystal is a 64 year old male.  HPI:     CC:    Chief Complaint   Patient presents with    Actinic Keratosis     LOV 10/30/24- Pt presents for AK follow up to the upper body (mostly face). Patient denies any new lesions or areas of concern. Patient has been using OTC retinol a few times a week to brown spots on the face with some improvement.   Personal hx of SCC (Right tonsil) and AKs. Pt denies a personal or family Hx of skin ca.     Hair/Scalp Problem     Pt presents with a concern of flaky bumps on the scalp x 1 month. Growths do come and go. Pt denies any treatments used.          Allergies:  Patient has no known allergies.    HISTORY:    Past Medical History:    Alcohol dependence with withdrawal (HCC)    Arthritis    Cancer (HCC)    squamous cell right tonsil    Decorative tattoo    Diet-controlled diabetes mellitus (HCC)    Elevated fasting blood sugar    Essential hypertension    High blood pressure    High triglycerides    Retinal detachment, right    Dr. Gonzalez    Thrombocytopenia    Type 2 diabetes mellitus without complication, without long-term current use of insulin (HCC)      Past Surgical History:   Procedure Laterality Date    Colonoscopy  2019    Dr. Azevedo: 5 adenomas, tics, int hemorrhoids - 3 year recall    Colonoscopy N/A 2023    Procedure: COLONOSCOPY;  Surgeon: Je Pan MD;  Location: [facility] ENDOSCOPY    Focal laser - od - right eye Right 2023    thermal laser photocoagulation for retinal detachment/horseshoe tear superotemporal    Hip replacement surgery Left 2018    Lasik Bilateral ~2005    Dr. Cardona- monovision- OD for distance OS for near    Rad resec tonsil/pillars  2018    Removal of tonsils,13+ y/o  2018    Tonsillectomy  2018    Vitrectomy,panretinal laser rx Right 2023    Dr. Gonzalez    [place] teeth removed        Family History   Problem Relation Age of Onset    Cancer Mother         uterine    Diabetes Neg     Glaucoma  Neg     Macular degeneration Neg       Social History     Socioeconomic History    Marital status:    Tobacco Use    Smoking status: Former     Types: Cigars     Passive exposure: Past    Smokeless tobacco: Former   Vaping Use    Vaping status: Never Used   Substance and Sexual Activity    Alcohol use: Not Currently    Drug use: No    Sexual activity: Not Currently     Partners: Female   Other Topics Concern    History of tanning Yes    Reaction to local anesthetic No    Pt has a pacemaker No    Pt has a defibrillator No        Current Outpatient Medications   Medication Sig Dispense Refill    Tretinoin 0.05 % External Cream Apply a small amount to face as directed at bedtime. 20 g 3    TRUEplus Lancets 33G Does not apply Misc by In Vitro route daily.      valACYclovir 1 G Oral Tab Take 1 tablet (1,000 mg total) by mouth every 12 (twelve) hours. (Patient not taking: Reported on 2025) 4 tablet 5    fluorometholone 0.1 % Ophthalmic Suspension Place 1 drop into both eyes 4 (four) times daily. (Patient not taking: Reported on 2025)      moxifloxacin 0.5 % Ophthalmic Solution INSTILL 1 DROP IN BOTH EYES FOUR TIMES DAILY AS DIRECTED (Patient not taking: Reported on 2025)       Allergies:   No Known Allergies    Past Medical History:    Alcohol dependence with withdrawal (HCC)    Arthritis    Cancer (HCC)    squamous cell right tonsil    Decorative tattoo    Diet-controlled diabetes mellitus (HCC)    Elevated fasting blood sugar    Essential hypertension    High blood pressure    High triglycerides    Retinal detachment, right    Dr. Gonzalez    Thrombocytopenia    Type 2 diabetes mellitus without complication, without long-term current use of insulin (HCC)     Past Surgical History:   Procedure Laterality Date    Colonoscopy  2019    Dr. Azevedo: 5 adenomas, tics, int hemorrhoids - 3 year recall    Colonoscopy N/A 2023    Procedure: COLONOSCOPY;  Surgeon: Je Pan MD;  Location: [facility]  ENDOSCOPY    Focal laser - od - right eye Right 01/04/2023    thermal laser photocoagulation for retinal detachment/horseshoe tear superotemporal    Hip replacement surgery Left 11/2018    Lasik Bilateral ~2005    Dr. Cardona- monovision- OD for distance OS for near    Rad resec tonsil/pillars  01/05/2018    Removal of tonsils,13+ y/o  01/05/2018    Tonsillectomy  01/05/2018    Vitrectomy,panretinal laser rx Right 01/17/2023    Dr. Gonzalez    [place] teeth removed       Social History     Socioeconomic History    Marital status:      Spouse name: Not on file    Number of children: Not on file    Years of education: Not on file    Highest education level: Not on file   Occupational History    Not on file   Tobacco Use    Smoking status: Former     Types: Cigars     Passive exposure: Past    Smokeless tobacco: Former   Vaping Use    Vaping status: Never Used   Substance and Sexual Activity    Alcohol use: Not Currently    Drug use: No    Sexual activity: Not Currently     Partners: Female   Other Topics Concern    Grew up on a farm Not Asked    History of tanning Yes    Outdoor occupation Not Asked    Reaction to local anesthetic No    Pt has a pacemaker No    Pt has a defibrillator No   Social History Narrative    Not on file     Social Drivers of Health     Food Insecurity: Not on file   Transportation Needs: Not on file   Stress: Not on file   Housing Stability: Not on file     Family History   Problem Relation Age of Onset    Cancer Mother         uterine    Diabetes Neg     Glaucoma Neg     Macular degeneration Neg        There were no vitals filed for this visit.    HPI:    Chief Complaint   Patient presents with    Actinic Keratosis     LOV 10/30/24- Pt presents for AK follow up to the upper body (mostly face). Patient denies any new lesions or areas of concern. Patient has been using OTC retinol a few times a week to brown spots on the face with some improvement.   Personal hx of SCC (Right tonsil) and  AKs. Pt denies a personal or family Hx of skin ca.     Hair/Scalp Problem     Pt presents with a concern of flaky bumps on the scalp x 1 month. Growths do come and go. Pt denies any treatments used.      No personal or family history of skin cancer loss of sun exposure.     Follow-up AK's  Note several lesions over the face scalp  Numerous nevi   Has been careful with sun protection    Patient with history of throat cancer tonsillar cancer as noted.  Has noticed more spots and is outside grew up in [place] lots of sun in the past.  More careful with sun protection currently    Past notes/ records and appropriate/relevant lab results including pathology and past body maps reviewed. Updated and new information noted in current visit.     History of Present Illness  Mandi Crystal is a 64 year old male who presents with concerns about skin changes and age spots.    He has noticed skin changes, including flaky areas with some bumps that are slightly itchy, particularly on his head and scalp. These changes resemble age spots and have been more pronounced at times. He has been using over-the-counter retinol products, such as Neutrogena, to address these skin changes.    He recently experienced a small scratch on his shin that became infected despite applying Neosporin immediately. He is concerned about the persistence of this mandi and whether it will fade over time. He frequently wears shorts, leading to minor injuries on his legs.    His father had similar skin issues and underwent treatment involving cream and light therapy, although he is unsure of the specific treatment details.    He spends a significant amount of time outdoors and does not consistently use sunscreen, particularly when riding his bike, which he believes may contribute to his skin issues.      Patient presents with concerns above.    Patient has been in their usual state of health.  History, medications, allergies reviewed as noted.      ROS:   Denies any other systemic complaints.  No new or changeing lesions other than noted above. No fevers, chills, night sweats, unusual sun sensitivity.  No other skin complaints.        History, medications, allergies reviewed as noted.       Physical Examination:     Well-developed well-nourished patient alert oriented in no acute distress.  Exam total-body performed, including scalp, head, neck, face,nails, hair, external eyes, including conjunctival mucosa, eyelids, lips external ears, back, chest,/ breasts, axillae,  abdomen, arms, legs, palms.     Multiple light to medium brown, well marginated, uniformly pigmented, macules and papules 6 mm and less scattered on exam. pigmented lesions examined with dermoscopy benign-appearing patterns.     Waxy tannish keratotic papules scattered, cherry-red vascular papules scattered.    See map today's date for lesions noted .      Otherwise remarkable for lesions as noted on map.  See details of examination  See Assessment /Plan for additional history and physical exam also:    Assessment / plan:    No orders of the defined types were placed in this encounter.      Meds & Refills for this Visit:  Requested Prescriptions     Signed Prescriptions Disp Refills    Tretinoin 0.05 % External Cream 20 g 3     Sig: Apply a small amount to face as directed at bedtime.         Encounter Diagnoses   Name Primary?    AK (actinic keratosis) Yes    Lentigo     Seborrheic keratoses     Benign neoplasm of skin, unspecified location     Multiple nevi        See details on map.      Remarkable for:    Patient seen for follow-up long-term monitoring, treatment of  Actinic keratoses sun damage  Plan of care:  ongoing surveillance, monitoring including regular follow-up due to longer term risk of recurrence, new lesions.  See previous notes.  There is a longitudinal care relationship with me, the care plan reflects the ongoing nature of the continuous relationship of care, and the medical record  indicates that there is ongoing treatment of a serious/complex medical condition which I am currently managing.  is Applicable      Physical Exam        Results        Assessment & Plan  Actinic Keratosis  Current lesions are not crusty or severe. Over-the-counter retinol products have provided some improvement. Discussed potential for using stronger vitamin A derivatives, such as tretinoin, which can help with precancerous changes and lighten darker spots. Concerns about insurance coverage for prescription treatments were noted.  - Prescribe tretinoin for use on affected areas, alternating with over-the-counter retinol products to minimize irritation.  - Discuss potential insurance coverage for prescription treatments.  - Advise on the use of tretinoin on the scalp for small spots.  - Recommend wearing a hat and monitoring skin changes.    Lentigo  Presence of lentigines, more prominent due to sun exposure. Interest in further treatment options to lighten these spots. Discussed use of tretinoin and potential addition of bleaching creams to address lentigines.  - Prescribe tretinoin for use on lentigines, alternating with over-the-counter retinol products.  - Consider adding bleaching creams if further lightening is desired.  - Advise on sun protection measures, including wearing a hat.    Recording duration: 5 minutes    Erythematous scaling keratotic papules noted at sites noted on map  Actinic Keratoses.  Precancerous nature discussed. Sun protection, sunscreen/ blocks encouraged Lesions treated with cryo- .  Biopsy if not resolved.    Arms     Actinic Keratoses.  Precancerous nature discussed. Sun protection, sunscreen/ blocks encouraged .  Monitoring for new lesions.  Sun damage additional recurrent and new actinic keratoses, skin cancers may occur in areas of prior actinic keratoses, related to past sun exposure to minimize current sun exposure.  Sunscreen applied consistently regularly, reapplication and  sun protection while driving recommended.    Lentigines at temples over-the-counter retinol.    SK left arm monitor  Otherwise no significant changes in exam    Benign keratoses on face previously improved    Patient traveling again was in [place].  Encourage sun protection ,sunscreen     Over the trunk, extremities  Waxy tan keratotic papules lesions in areas of concern as noted reassurance given.  Benign nature discussed.  Possibility of cryo, alphahydroxy acids over-the-counter retinol's discussed.      Nevi unchanged continue monitoring.  extensive lentigines larger lentigos over the upper arms left in particular.  Stable continue monitoring    Numerous small nevi over the lower extremities arms back observe.  Benign patterns on dermoscopy    Multiple waxy tan papules over the left lateral cheek zygoma upper back, back shoulders arms consistent with benign keratoses reassurance.  No other significant changes.  Continue careful sun protection recheck in 4 to 6 months or as needed    Extensive sun damage consider retinol daily for face as tolerated.  Careful daily sunscreen use.    No suspicious nevi.  Recommend follow-up in 4 to 6 months.  Patient at risk for additional skin cancers.    History of throat cancer lots of sun exposure would suggest HPV vaccine.  Will discuss with his PCP.    No other susupicious lesions on todays  exam.    Please refer to map for specific lesions.  See additional diagnoses.  Pros cons of various therapies, risks benefits discussed.Pathophysiology discussed with patient.  Therapeutic options reviewed.  See  Medications in grid.  Instructions reviewed at length.    Benign nevi, seborrheic  keratoses, cherry angiomas:  Reassurance regarding other benign skin lesions.    Monitor for new or changing lesions. Signs and symptoms of skin cancer, ABCDE's of melanoma ( additional information available at AAD.org, skincancer.org) Encourage Sunscreen (broad-spectrum, ideally mineral-based-UVA/UVB  -SPF 30 or higher) use encouraged, sun protection/sun protective clothing, self exams reviewed Followup as noted RTC ---routine checkup 6 mos -one year or p.r.n.    Encounter Times   Including precharting, reviewing chart, prior notes obtaining history: 10 minutes, medical exam :10 minutes, notes on body map, plan, counseling 10minutes My total time spent caring for the patient on the day of the encounter: 30 minutes     The patient indicates understanding of these issues and agrees to the plan.  The patient is asked to return as noted in follow-up/ above.    This note was generated using Dragon voice recognition software.  Please contact me regarding any confusion resulting from errors in recognition..  Note to patient and family: The 21st Century Cures Act makes medical notes like these available to patients. However, be advised this is a medical document. It is intended as aeky-wa-kwsu communication and monitoring of a patient's care needs. It is written in medical language and may contain abbreviations or verbiage that are unfamiliar. It may appear blunt or direct. Medical documents are intended to carry relevant information, facts as evident and the clinical opinion of the practitioner.

## (undated) DEVICE — SUTURE CHROMIC GUT 2-0 UR-6

## (undated) DEVICE — SUCTION CANISTER, 3000CC,SAFELINER: Brand: DEROYAL

## (undated) DEVICE — KIT ENDO ORCAPOD 160/180/190

## (undated) DEVICE — ELECTROSURGICAL SUCTION COAGULATOR, 10FR

## (undated) DEVICE — MEDI-VAC NON-CONDUCTIVE SUCTION TUBING 6MM X 1.8M (6FT.) L: Brand: CARDINAL HEALTH

## (undated) DEVICE — T&A: Brand: MEDLINE INDUSTRIES, INC.

## (undated) DEVICE — CATH URTH BARD 10FR 16IN 2

## (undated) DEVICE — SNARE OPTMZ PLPCTM TRP

## (undated) DEVICE — SNARE CAPTIFLEX MICRO-OVL OLY

## (undated) DEVICE — Device: Brand: DUAL NARE NASAL CANNULAE FEMALE LUER CON 7FT O2 TUBE

## (undated) DEVICE — EYE PADSSTERILENOT MADE WITH NATURAL RUBBER LATEXSINGLE USE ONLYDO NOT USE IF PACKAGE OPENED OR DAMAGED: Brand: CARDINAL HEALTH

## (undated) DEVICE — SOLUTION IRR BTL 250CC NACL

## (undated) DEVICE — STERILE LATEX POWDER-FREE SURGICAL GLOVES WITH HYDROGEL COATING, SMOOTH FINISH, STRAIGHT FINGER: Brand: PROTEXIS

## (undated) DEVICE — KIT CLEAN ENDOKIT 1.1OZ GOWNX2

## (undated) DEVICE — 60 ML SYRINGE REGULAR TIP: Brand: MONOJECT

## (undated) DEVICE — CONMED ACCESSORY ELECTRODE, NEEDLE ELECTRODE

## (undated) NOTE — LETTER
June 23, 2020    Maddie Santillan MD  [address] [facility]     Patient: Briscoe Bernheim   YOB: 1960   Date of Visit: 6/23/2020       Dear Dr. Tam Levine MD:    Thank you for referring Birdie Sextong to me for evaluation.  Here is Social History: Social History    Tobacco Use      Smoking status: Former Smoker        Types: Cigars      Smokeless tobacco: Former User    Alcohol use: Not Currently    Drug use: No      Medications:  Current Outpatient Medications   Medication Sig Dispe Lens Trace Nuclear sclerosis, Trace Cortical cataract Trace Nuclear sclerosis, Trace Cortical cataract    Vitreous Vitreous floaters Vitreous floaters          Fundus Exam       Right Left    Disc Good rim, Temporal crescent Good rim, Temporal crescent Scribed by: Amy Ramires MD      If you have questions, please do not hesitate to call me. I look forward to following Gamaida Hunter along with you.     Sincerely,        Amy Ramires MD        CC: No Recipients    Document electronically generated by: Shelly Krishnan

## (undated) NOTE — ED AVS SNAPSHOT
Mark Anthony Brumfield   MRN: [id number]    Department:  [facility] Emergency Department   Date of Visit:  8/24/2018           Disclosure     Insurance plans vary and the physician(s) referred by the ER may not be covered by your plan.  Please conta CARE PHYSICIAN AT ONCE OR RETURN IMMEDIATELY TO THE EMERGENCY DEPARTMENT. If you have been prescribed any medication(s), please fill your prescription right away and begin taking the medication(s) as directed.   If you believe that any of the medications

## (undated) NOTE — MR AVS SNAPSHOT
[address] [facility]  [phone]               Thank you for choosing us for your health care visit with Concepcion Bales MD.  We are glad to serve you and happy to provide you with this summary insurance company's guidelines for prior authorization for this test.  You may be held responsible for payment in full if proper authorization is not acquired.   Please contact the Patient Business Office at [phone] if you have any questions related to

## (undated) NOTE — MR AVS SNAPSHOT
[address] [facility]  [phone]               Thank you for choosing us for your health care visit with Heidi Hawthorne MD.  We are glad to serve you and happy to provide you with this summary not sign up before the expiration date, you must request a new code. Your unique [employer] Access Code: [email or web address]  Expires: 4/14/2017 11:38 AM    If you have questions, you can call [phone] to talk to our [facility] Staff.  Remember, [employer]

## (undated) NOTE — MR AVS SNAPSHOT
[address] [facility]  [phone]               Thank you for choosing us for your health care visit with Heidi Hawthorne MD.  We are glad to serve you and happy to provide you with this summary weekend appointments for your exam are available. Walk-in patients are welcome for most exams. [facility]  Diagnostics Main [facility] Parking) (Yellow Parking)  [address]   [address] you have any questions related to insurance coverage. Thank you. [facility]     Sign up for [facility], your secure online medical record.   [facility] will allow you to access patient instructions from your recent visit,  view other health information, and Get your heart pumping – brisk walking, biking, swimming Even 10 minute increments are effective and add up over the week   2 ½ hours per week – spread out over time Use a nita to keep you motivated   Don’t forget strength training with weights and resist

## (undated) NOTE — LETTER
[address]  Authorization for Invasive Procedure                                                                                           I hereby authorize Vivien Malcolm MD, my physician and his/her assistants (if applicable), which may include medical students, residents, and/or fellows, to perform the following surgical operation/ procedure and administer such anesthesia as may be determined necessary by my physician: Operation/Procedure name (s) COLONOSCOPY on Luis    2. I recognize that during the surgical operation/procedure, unforeseen conditions may necessitate additional or different procedures than those listed above. I, therefore, further authorize and request that the above-named surgeon, assistants, or designees perform such procedures as are, in their judgment, necessary and desirable. 3.   My surgeon/physician has discussed prior to my surgery the potential benefits, risks and side effects of this procedure; the likelihood of achieving goals; and potential problems that might occur during recuperation. They also discussed reasonable alternatives to the procedure, including risks, benefits, and side effects related to the alternatives and risks related to not receiving this procedure. I have had all my questions answered and I acknowledge that no guarantee has been made as to the result that may be obtained. 4.   Should the need arise during my operation/procedure, which includes change of level of care prior to discharge, I also consent to the administration of blood and/or blood products. Further, I understand that despite careful testing and screening of blood or blood products by collecting agencies, I may still be subject to ill effects as a result of receiving a blood transfusion and/or blood products.   The following are some, but not all, of the potential risks that can occur: fever and allergic reactions, hemolytic reactions, transmission of diseases such as Hepatitis, AIDS and Cytomegalovirus (CMV) and fluid overload. In the event that I wish to have an autologous transfusion of my own blood, or a directed donor transfusion, I will discuss this with my physician. Check only if Refusing Blood or Blood Products  I understand refusal of blood or blood products as deemed necessary by my physician may have serious consequences to my condition to include possible death. I hereby assume responsibility for my refusal and release the hospital, its personnel, and my physicians from any responsibility for the consequences of my refusal.    o  Refuse   5. I authorize the use of any specimen, organs, tissues, body parts or foreign objects that may be removed from my body during the operation/procedure for diagnosis, research or teaching purposes and their subsequent disposal by hospital authorities. I also authorize the release of specimen test results and/or written reports to my treating physician on the hospital medical staff or other referring or consulting physicians involved in my care, at the discretion of the Pathologist or my treating physician. 6.   I consent to the photographing or videotaping of the operations or procedures to be performed, including appropriate portions of my body for medical, scientific, or educational purposes, provided my identity is not revealed by the pictures or by descriptive texts accompanying them. If the procedure has been photographed/videotaped, the surgeon will obtain the original picture, image, videotape or CD. The hospital will not be responsible for storage, release or maintenance of the picture, image, tape or CD.    7.   I consent to the presence of a  or observers in the operating room as deemed necessary by my physician or their designees.     8.   I recognize that in the event my procedure results in extended X-Ray/fluoroscopy time, I may develop a skin reaction. 9. If I have a Do Not Attempt Resuscitation (DNAR) order in place, that status will be suspended while in the operating room, procedural suite, and during the recovery period unless otherwise explicitly stated by me (or a person authorized to consent on my behalf). The surgeon or my attending physician will determine when the applicable recovery period ends for purposes of reinstating the DNAR order. 10. Patients having a sterilization procedure: I understand that if the procedure is successful the results will be permanent and it will therefore be impossible for me to inseminate, conceive, or bear children. I also understand that the procedure is intended to result in sterility, although the result has not been guaranteed. 11. I acknowledge that my physician has explained sedation/analgesia administration to me including the risk and benefits I consent to the administration of sedation/analgesia as may be necessary or desirable in the judgment of my physician. I CERTIFY THAT I HAVE READ AND FULLY UNDERSTAND THE ABOVE CONSENT TO OPERATION and/or OTHER PROCEDURE.     _________________________________________ _________________________________     ___________________________________  Signature of Patient     Signature of Responsible Person                   Printed Name of Responsible Person                              _________________________________________ ______________________________        ___________________________________  Signature of Witness         Date  Time         Relationship to Patient    STATEMENT OF PHYSICIAN My signature below affirms that prior to the time of the procedure; I have explained to the patient and/or his/her legal representative, the risks and benefits involved in the proposed treatment and any reasonable alternative to the proposed treatment.  I have also explained the risks and benefits involved in refusal of the proposed treatment and alternatives to the proposed treatment and have answered the patient's questions.  If I have a significant financial interest in a co-management agreement or a significant financial interest in any product or implant, or other significant relationship used in this procedure/surgery, I have disclosed this and had a discussion with my patient.     _______________________________________________________________ _____________________________  Jeraline Starch of Physician)                                                                                         (Date)                                   (Time)  Patient Name: Frieda     : 1960   Printed: 2023      Medical Record #: [id number]                                              Page 1 of 1

## (undated) NOTE — LETTER
Calvin Mejia, [address] [facility]       11/29/17        Patient: Lulu Rhodes   YOB: 1960   Date of Visit: 11/29/2017       Dear  Dr. Maximo uQreshi MD,      Thank you for referring Lulu Rhodes to my pr